# Patient Record
Sex: MALE | Race: WHITE | NOT HISPANIC OR LATINO | Employment: OTHER | URBAN - METROPOLITAN AREA
[De-identification: names, ages, dates, MRNs, and addresses within clinical notes are randomized per-mention and may not be internally consistent; named-entity substitution may affect disease eponyms.]

---

## 2017-12-01 PROBLEM — I50.22 CHRONIC SYSTOLIC CONGESTIVE HEART FAILURE (HCC): Status: ACTIVE | Noted: 2017-12-01

## 2017-12-01 PROBLEM — M54.31 RIGHT SIDED SCIATICA: Status: ACTIVE | Noted: 2017-12-01

## 2017-12-01 PROBLEM — Z79.01 CHRONIC ANTICOAGULATION: Status: ACTIVE | Noted: 2017-12-01

## 2017-12-01 PROBLEM — I48.19 PERSISTENT ATRIAL FIBRILLATION (HCC): Status: ACTIVE | Noted: 2017-12-01

## 2017-12-07 PROBLEM — J30.1 CHRONIC SEASONAL ALLERGIC RHINITIS DUE TO POLLEN: Status: ACTIVE | Noted: 2017-12-07

## 2017-12-07 PROBLEM — N52.01 ERECTILE DYSFUNCTION DUE TO ARTERIAL INSUFFICIENCY: Status: ACTIVE | Noted: 2017-12-07

## 2017-12-07 PROBLEM — Z85.46 HISTORY OF PROSTATE CANCER: Status: ACTIVE | Noted: 2017-12-07

## 2017-12-07 PROBLEM — E78.49 OTHER HYPERLIPIDEMIA: Status: ACTIVE | Noted: 2017-12-07

## 2018-01-04 PROBLEM — N18.2 CRF (CHRONIC RENAL FAILURE), STAGE 2 (MILD): Status: ACTIVE | Noted: 2018-01-04

## 2018-02-21 PROBLEM — G89.29 CHRONIC BILATERAL LOW BACK PAIN WITH RIGHT-SIDED SCIATICA: Status: ACTIVE | Noted: 2018-02-21

## 2018-02-21 PROBLEM — R26.2 DIFFICULTY WALKING: Status: ACTIVE | Noted: 2018-02-21

## 2018-02-21 PROBLEM — M54.41 CHRONIC BILATERAL LOW BACK PAIN WITH RIGHT-SIDED SCIATICA: Status: ACTIVE | Noted: 2018-02-21

## 2018-04-13 PROBLEM — M16.9 HIP OSTEOARTHRITIS: Status: ACTIVE | Noted: 2018-04-13

## 2018-04-13 PROBLEM — M19.012 LOCALIZED OSTEOARTHRITIS OF LEFT SHOULDER: Status: ACTIVE | Noted: 2018-04-13

## 2018-05-02 PROBLEM — M47.816 SPONDYLOSIS OF LUMBAR REGION WITHOUT MYELOPATHY OR RADICULOPATHY: Status: ACTIVE | Noted: 2018-05-02

## 2018-05-02 PROBLEM — M54.31 RIGHT SIDED SCIATICA: Status: RESOLVED | Noted: 2017-12-01 | Resolved: 2018-05-02

## 2018-06-07 PROBLEM — M35.3 PMR (POLYMYALGIA RHEUMATICA) (HCC): Status: ACTIVE | Noted: 2018-06-07

## 2018-11-06 ENCOUNTER — APPOINTMENT (OUTPATIENT)
Dept: RADIOLOGY | Facility: MEDICAL CENTER | Age: 83
End: 2018-11-06
Attending: EMERGENCY MEDICINE
Payer: MEDICARE

## 2018-11-06 ENCOUNTER — HOSPITAL ENCOUNTER (OUTPATIENT)
Facility: MEDICAL CENTER | Age: 83
End: 2018-11-08
Attending: EMERGENCY MEDICINE | Admitting: INTERNAL MEDICINE
Payer: MEDICARE

## 2018-11-06 ENCOUNTER — HOSPITAL ENCOUNTER (OUTPATIENT)
Dept: RADIOLOGY | Facility: MEDICAL CENTER | Age: 83
End: 2018-11-06

## 2018-11-06 DIAGNOSIS — R47.01 APHASIA: ICD-10-CM

## 2018-11-06 DIAGNOSIS — M35.3 PMR (POLYMYALGIA RHEUMATICA) (HCC): ICD-10-CM

## 2018-11-06 DIAGNOSIS — I50.22 CHRONIC SYSTOLIC CONGESTIVE HEART FAILURE (HCC): ICD-10-CM

## 2018-11-06 DIAGNOSIS — G45.9 TIA (TRANSIENT ISCHEMIC ATTACK): ICD-10-CM

## 2018-11-06 DIAGNOSIS — I48.91 ATRIAL FIBRILLATION, UNSPECIFIED TYPE (HCC): ICD-10-CM

## 2018-11-06 PROBLEM — I10 HYPERTENSION: Status: ACTIVE | Noted: 2018-11-06

## 2018-11-06 PROBLEM — R17 ELEVATED BILIRUBIN: Status: ACTIVE | Noted: 2018-11-06

## 2018-11-06 LAB
ABO GROUP BLD: NORMAL
ABO GROUP BLD: NORMAL
ALBUMIN SERPL BCP-MCNC: 4.1 G/DL (ref 3.2–4.9)
ALBUMIN/GLOB SERPL: 1.3 G/DL
ALP SERPL-CCNC: 66 U/L (ref 30–99)
ALT SERPL-CCNC: 25 U/L (ref 2–50)
ANION GAP SERPL CALC-SCNC: 10 MMOL/L (ref 0–11.9)
APPEARANCE UR: CLEAR
APTT PPP: 28.1 SEC (ref 24.7–36)
AST SERPL-CCNC: 26 U/L (ref 12–45)
BASOPHILS # BLD AUTO: 0.6 % (ref 0–1.8)
BASOPHILS # BLD: 0.07 K/UL (ref 0–0.12)
BILIRUB CONJ SERPL-MCNC: 0.3 MG/DL (ref 0.1–0.5)
BILIRUB SERPL-MCNC: 2.1 MG/DL (ref 0.1–1.5)
BILIRUB UR QL STRIP.AUTO: NEGATIVE
BLD GP AB SCN SERPL QL: NORMAL
BUN SERPL-MCNC: 22 MG/DL (ref 8–22)
CALCIUM SERPL-MCNC: 9.6 MG/DL (ref 8.5–10.5)
CHLORIDE SERPL-SCNC: 108 MMOL/L (ref 96–112)
CO2 SERPL-SCNC: 25 MMOL/L (ref 20–33)
COLOR UR: YELLOW
CREAT SERPL-MCNC: 1.21 MG/DL (ref 0.5–1.4)
EOSINOPHIL # BLD AUTO: 0.29 K/UL (ref 0–0.51)
EOSINOPHIL NFR BLD: 2.4 % (ref 0–6.9)
ERYTHROCYTE [DISTWIDTH] IN BLOOD BY AUTOMATED COUNT: 52.5 FL (ref 35.9–50)
GLOBULIN SER CALC-MCNC: 3.2 G/DL (ref 1.9–3.5)
GLUCOSE SERPL-MCNC: 82 MG/DL (ref 65–99)
GLUCOSE UR STRIP.AUTO-MCNC: NEGATIVE MG/DL
HCT VFR BLD AUTO: 53.2 % (ref 42–52)
HGB BLD-MCNC: 17.8 G/DL (ref 14–18)
IMM GRANULOCYTES # BLD AUTO: 0.08 K/UL (ref 0–0.11)
IMM GRANULOCYTES NFR BLD AUTO: 0.7 % (ref 0–0.9)
INR PPP: 1.78 (ref 0.87–1.13)
KETONES UR STRIP.AUTO-MCNC: NEGATIVE MG/DL
LEUKOCYTE ESTERASE UR QL STRIP.AUTO: NEGATIVE
LYMPHOCYTES # BLD AUTO: 2.19 K/UL (ref 1–4.8)
LYMPHOCYTES NFR BLD: 18.1 % (ref 22–41)
MAGNESIUM SERPL-MCNC: 2.1 MG/DL (ref 1.5–2.5)
MCH RBC QN AUTO: 34.1 PG (ref 27–33)
MCHC RBC AUTO-ENTMCNC: 33.5 G/DL (ref 33.7–35.3)
MCV RBC AUTO: 101.9 FL (ref 81.4–97.8)
MICRO URNS: ABNORMAL
MONOCYTES # BLD AUTO: 1.41 K/UL (ref 0–0.85)
MONOCYTES NFR BLD AUTO: 11.6 % (ref 0–13.4)
NEUTROPHILS # BLD AUTO: 8.07 K/UL (ref 1.82–7.42)
NEUTROPHILS NFR BLD: 66.6 % (ref 44–72)
NITRITE UR QL STRIP.AUTO: NEGATIVE
NRBC # BLD AUTO: 0 K/UL
NRBC BLD-RTO: 0 /100 WBC
PH UR STRIP.AUTO: 5.5 [PH]
PLATELET # BLD AUTO: 164 K/UL (ref 164–446)
PMV BLD AUTO: 10.7 FL (ref 9–12.9)
POTASSIUM SERPL-SCNC: 3.9 MMOL/L (ref 3.6–5.5)
PROT SERPL-MCNC: 7.3 G/DL (ref 6–8.2)
PROT UR QL STRIP: NEGATIVE MG/DL
PROTHROMBIN TIME: 20.8 SEC (ref 12–14.6)
RBC # BLD AUTO: 5.22 M/UL (ref 4.7–6.1)
RBC UR QL AUTO: NEGATIVE
RH BLD: NORMAL
RH BLD: NORMAL
SODIUM SERPL-SCNC: 143 MMOL/L (ref 135–145)
SP GR UR STRIP.AUTO: >=1.045
TROPONIN I SERPL-MCNC: 0.02 NG/ML (ref 0–0.04)
TSH SERPL DL<=0.005 MIU/L-ACNC: 3.37 UIU/ML (ref 0.38–5.33)
UROBILINOGEN UR STRIP.AUTO-MCNC: 0.2 MG/DL
WBC # BLD AUTO: 12.1 K/UL (ref 4.8–10.8)

## 2018-11-06 PROCEDURE — 99285 EMERGENCY DEPT VISIT HI MDM: CPT

## 2018-11-06 PROCEDURE — 81003 URINALYSIS AUTO W/O SCOPE: CPT

## 2018-11-06 PROCEDURE — 82248 BILIRUBIN DIRECT: CPT

## 2018-11-06 PROCEDURE — G0378 HOSPITAL OBSERVATION PER HR: HCPCS

## 2018-11-06 PROCEDURE — 71045 X-RAY EXAM CHEST 1 VIEW: CPT

## 2018-11-06 PROCEDURE — 84484 ASSAY OF TROPONIN QUANT: CPT | Mod: 91

## 2018-11-06 PROCEDURE — 700117 HCHG RX CONTRAST REV CODE 255: Performed by: EMERGENCY MEDICINE

## 2018-11-06 PROCEDURE — 86901 BLOOD TYPING SEROLOGIC RH(D): CPT

## 2018-11-06 PROCEDURE — 85025 COMPLETE CBC W/AUTO DIFF WBC: CPT | Mod: 91

## 2018-11-06 PROCEDURE — 70498 CT ANGIOGRAPHY NECK: CPT

## 2018-11-06 PROCEDURE — 83735 ASSAY OF MAGNESIUM: CPT

## 2018-11-06 PROCEDURE — 36415 COLL VENOUS BLD VENIPUNCTURE: CPT

## 2018-11-06 PROCEDURE — 80053 COMPREHEN METABOLIC PANEL: CPT | Mod: 91

## 2018-11-06 PROCEDURE — 94760 N-INVAS EAR/PLS OXIMETRY 1: CPT

## 2018-11-06 PROCEDURE — 0042T CT-CEREBRAL PERFUSION ANALYSIS: CPT

## 2018-11-06 PROCEDURE — 85610 PROTHROMBIN TIME: CPT | Mod: 91

## 2018-11-06 PROCEDURE — 70450 CT HEAD/BRAIN W/O DYE: CPT

## 2018-11-06 PROCEDURE — 93005 ELECTROCARDIOGRAM TRACING: CPT | Performed by: EMERGENCY MEDICINE

## 2018-11-06 PROCEDURE — 700102 HCHG RX REV CODE 250 W/ 637 OVERRIDE(OP): Performed by: INTERNAL MEDICINE

## 2018-11-06 PROCEDURE — 70496 CT ANGIOGRAPHY HEAD: CPT

## 2018-11-06 PROCEDURE — 99220 PR INITIAL OBSERVATION CARE,LEVL III: CPT | Performed by: INTERNAL MEDICINE

## 2018-11-06 PROCEDURE — 86900 BLOOD TYPING SEROLOGIC ABO: CPT

## 2018-11-06 PROCEDURE — 84443 ASSAY THYROID STIM HORMONE: CPT

## 2018-11-06 PROCEDURE — 86850 RBC ANTIBODY SCREEN: CPT

## 2018-11-06 PROCEDURE — A9270 NON-COVERED ITEM OR SERVICE: HCPCS | Performed by: INTERNAL MEDICINE

## 2018-11-06 PROCEDURE — 85730 THROMBOPLASTIN TIME PARTIAL: CPT | Mod: 91

## 2018-11-06 RX ORDER — ONDANSETRON 4 MG/1
4 TABLET, ORALLY DISINTEGRATING ORAL EVERY 4 HOURS PRN
Status: DISCONTINUED | OUTPATIENT
Start: 2018-11-06 | End: 2018-11-08 | Stop reason: HOSPADM

## 2018-11-06 RX ORDER — ONDANSETRON 2 MG/ML
4 INJECTION INTRAMUSCULAR; INTRAVENOUS EVERY 4 HOURS PRN
Status: DISCONTINUED | OUTPATIENT
Start: 2018-11-06 | End: 2018-11-08 | Stop reason: HOSPADM

## 2018-11-06 RX ORDER — WARFARIN SODIUM 2.5 MG/1
2.5 TABLET ORAL
Status: DISCONTINUED | OUTPATIENT
Start: 2018-11-08 | End: 2018-11-07

## 2018-11-06 RX ORDER — ASPIRIN 325 MG
325 TABLET ORAL DAILY
Status: DISCONTINUED | OUTPATIENT
Start: 2018-11-06 | End: 2018-11-06

## 2018-11-06 RX ORDER — LABETALOL HYDROCHLORIDE 5 MG/ML
10 INJECTION, SOLUTION INTRAVENOUS EVERY 4 HOURS PRN
Status: DISCONTINUED | OUTPATIENT
Start: 2018-11-06 | End: 2018-11-08 | Stop reason: HOSPADM

## 2018-11-06 RX ORDER — GABAPENTIN 300 MG/1
300 CAPSULE ORAL 3 TIMES DAILY
Status: DISCONTINUED | OUTPATIENT
Start: 2018-11-06 | End: 2018-11-08 | Stop reason: HOSPADM

## 2018-11-06 RX ORDER — HYDRALAZINE HYDROCHLORIDE 20 MG/ML
10 INJECTION INTRAMUSCULAR; INTRAVENOUS
Status: DISCONTINUED | OUTPATIENT
Start: 2018-11-06 | End: 2018-11-08 | Stop reason: HOSPADM

## 2018-11-06 RX ORDER — AMOXICILLIN 250 MG
2 CAPSULE ORAL 2 TIMES DAILY
Status: DISCONTINUED | OUTPATIENT
Start: 2018-11-07 | End: 2018-11-08 | Stop reason: HOSPADM

## 2018-11-06 RX ORDER — WARFARIN SODIUM 4 MG/1
4 TABLET ORAL
Status: COMPLETED | OUTPATIENT
Start: 2018-11-06 | End: 2018-11-06

## 2018-11-06 RX ORDER — ASPIRIN 81 MG/1
81 TABLET, CHEWABLE ORAL DAILY
Status: DISCONTINUED | OUTPATIENT
Start: 2018-11-06 | End: 2018-11-08 | Stop reason: HOSPADM

## 2018-11-06 RX ORDER — WARFARIN SODIUM 2.5 MG/1
1.25 TABLET ORAL
Status: DISCONTINUED | OUTPATIENT
Start: 2018-11-07 | End: 2018-11-07

## 2018-11-06 RX ORDER — ACETAMINOPHEN 325 MG/1
650 TABLET ORAL EVERY 6 HOURS PRN
Status: DISCONTINUED | OUTPATIENT
Start: 2018-11-06 | End: 2018-11-08 | Stop reason: HOSPADM

## 2018-11-06 RX ORDER — BISACODYL 10 MG
10 SUPPOSITORY, RECTAL RECTAL
Status: DISCONTINUED | OUTPATIENT
Start: 2018-11-06 | End: 2018-11-08 | Stop reason: HOSPADM

## 2018-11-06 RX ORDER — ASPIRIN 81 MG/1
324 TABLET, CHEWABLE ORAL DAILY
Status: DISCONTINUED | OUTPATIENT
Start: 2018-11-06 | End: 2018-11-06

## 2018-11-06 RX ORDER — POLYETHYLENE GLYCOL 3350 17 G/17G
1 POWDER, FOR SOLUTION ORAL
Status: DISCONTINUED | OUTPATIENT
Start: 2018-11-06 | End: 2018-11-08 | Stop reason: HOSPADM

## 2018-11-06 RX ORDER — CARVEDILOL 6.25 MG/1
3.12 TABLET ORAL 2 TIMES DAILY WITH MEALS
Status: DISCONTINUED | OUTPATIENT
Start: 2018-11-06 | End: 2018-11-08 | Stop reason: HOSPADM

## 2018-11-06 RX ORDER — WARFARIN SODIUM 2.5 MG/1
2.5 TABLET ORAL DAILY
COMMUNITY
End: 2020-04-14 | Stop reason: SDUPTHER

## 2018-11-06 RX ORDER — ASPIRIN 300 MG/1
300 SUPPOSITORY RECTAL DAILY
Status: DISCONTINUED | OUTPATIENT
Start: 2018-11-06 | End: 2018-11-06

## 2018-11-06 RX ORDER — ATORVASTATIN CALCIUM 40 MG/1
40 TABLET, FILM COATED ORAL EVERY EVENING
Status: DISCONTINUED | OUTPATIENT
Start: 2018-11-06 | End: 2018-11-08 | Stop reason: HOSPADM

## 2018-11-06 RX ORDER — GABAPENTIN 300 MG/1
300 CAPSULE ORAL 3 TIMES DAILY
COMMUNITY
End: 2020-02-14

## 2018-11-06 RX ORDER — CHLORAL HYDRATE 500 MG
1000 CAPSULE ORAL EVERY EVENING
COMMUNITY

## 2018-11-06 RX ORDER — PREDNISONE 5 MG/1
6 TABLET ORAL DAILY
COMMUNITY
End: 2018-11-09 | Stop reason: SDUPTHER

## 2018-11-06 RX ADMIN — ASPIRIN 81 MG: 81 TABLET, CHEWABLE ORAL at 19:54

## 2018-11-06 RX ADMIN — CARVEDILOL 3.12 MG: 6.25 TABLET, FILM COATED ORAL at 19:55

## 2018-11-06 RX ADMIN — WARFARIN SODIUM 4 MG: 4 TABLET ORAL at 20:32

## 2018-11-06 RX ADMIN — IOHEXOL 140 ML: 350 INJECTION, SOLUTION INTRAVENOUS at 13:14

## 2018-11-06 RX ADMIN — GABAPENTIN 300 MG: 300 CAPSULE ORAL at 19:53

## 2018-11-06 RX ADMIN — ATORVASTATIN CALCIUM 40 MG: 40 TABLET, FILM COATED ORAL at 19:54

## 2018-11-06 ASSESSMENT — COGNITIVE AND FUNCTIONAL STATUS - GENERAL
DRESSING REGULAR UPPER BODY CLOTHING: A LITTLE
WALKING IN HOSPITAL ROOM: A LITTLE
SUGGESTED CMS G CODE MODIFIER MOBILITY: CK
TOILETING: A LITTLE
SUGGESTED CMS G CODE MODIFIER DAILY ACTIVITY: CK
EATING MEALS: A LITTLE
TURNING FROM BACK TO SIDE WHILE IN FLAT BAD: A LITTLE
MOVING TO AND FROM BED TO CHAIR: A LITTLE
MOBILITY SCORE: 18
STANDING UP FROM CHAIR USING ARMS: A LITTLE
HELP NEEDED FOR BATHING: A LITTLE
DAILY ACTIVITIY SCORE: 18
MOVING FROM LYING ON BACK TO SITTING ON SIDE OF FLAT BED: A LITTLE
PERSONAL GROOMING: A LITTLE
CLIMB 3 TO 5 STEPS WITH RAILING: A LITTLE
DRESSING REGULAR LOWER BODY CLOTHING: A LITTLE

## 2018-11-06 ASSESSMENT — CHA2DS2 SCORE
CHA2DS2 VASC SCORE: 5
VASCULAR DISEASE: NO
DIABETES: NO
CHF OR LEFT VENTRICULAR DYSFUNCTION: NO
SEX: MALE
PRIOR STROKE OR TIA OR THROMBOEMBOLISM: YES
AGE 75 OR GREATER: YES
AGE 65 TO 74: NO
HYPERTENSION: YES

## 2018-11-06 ASSESSMENT — ENCOUNTER SYMPTOMS
SEIZURES: 0
FEVER: 0
DIZZINESS: 0
DOUBLE VISION: 0
BACK PAIN: 0
NERVOUS/ANXIOUS: 0
VOMITING: 0
CHILLS: 0
PHOTOPHOBIA: 0
SPEECH CHANGE: 1
NECK PAIN: 0
BLURRED VISION: 0
HALLUCINATIONS: 0
HEADACHES: 0
HEMOPTYSIS: 0
SENSORY CHANGE: 0
HEARTBURN: 0
ORTHOPNEA: 0
SPUTUM PRODUCTION: 0
DEPRESSION: 0
WEIGHT LOSS: 0
TINGLING: 0
MYALGIAS: 0
FOCAL WEAKNESS: 0
BRUISES/BLEEDS EASILY: 0
COUGH: 0
PALPITATIONS: 0
NAUSEA: 0

## 2018-11-06 ASSESSMENT — PATIENT HEALTH QUESTIONNAIRE - PHQ9
SUM OF ALL RESPONSES TO PHQ9 QUESTIONS 1 AND 2: 0
2. FEELING DOWN, DEPRESSED, IRRITABLE, OR HOPELESS: NOT AT ALL
1. LITTLE INTEREST OR PLEASURE IN DOING THINGS: NOT AT ALL

## 2018-11-06 ASSESSMENT — LIFESTYLE VARIABLES: SUBSTANCE_ABUSE: 0

## 2018-11-06 ASSESSMENT — PAIN SCALES - GENERAL: PAINLEVEL_OUTOF10: 0

## 2018-11-06 NOTE — ED TRIAGE NOTES
Chief Complaint   Patient presents with   • Possible Stroke     Pt BIB Med flight transfer from [facility]/ pt @ 0930 with expressive apahgia, now resolved/ pt on Coumadin. pt A&Ox4     Pt to CT.

## 2018-11-06 NOTE — DISCHARGE PLANNING
PMR order received from Dr. Man.  MCR/ANT is shown for his medical provider.  Presented from [facility] with stroke-like sx.  W/U & TX evals are pending.  TCC remains monitoring.  This referral will not be forwarded to Physiatry @ this time.  I do appreciate the referral.

## 2018-11-06 NOTE — ED NOTES
Med rec complete per medication bottles at bedside (returned)  Allergies reviewed  No PO antibiotics in last 30 days    Patient takes Coumadin 2.5 mg Monday and Thursday and 1.25 mg all other day, wife stated 11-5-18 (Monday) she accidentally gave a 1/2 tablet instead of a whole tablet

## 2018-11-06 NOTE — H&P
Hospital Medicine History and Physical    Date of Service  11/6/2018    Chief Complaint  Chief Complaint   Patient presents with   • Possible Stroke     Pt BIB Med flight transfer from [facility] center/ pt @ 0930 with expressive apahgia, now resolved/ pt on Coumadin. pt A&Ox4       History of Presenting Illness  85 y.o. male with past medical history of hypertension, atrial fibrillation, on chronic anticoagulation with warfarin, pacemaker, prostate cancer, who was transferred from [facility] with suspicion for possible stroke or TIA.  At approximately 9:30 AM today he began experiencing aphasia, when he was having difficulty expressing himself.  Apparently symptoms resolved before he was transferred.  CAT scan of the head without contrast at outside facility, CT a of the neck and head negative for obstruction.  INR was found to be at 1.77.    Primary Care Physician  Adolph Johnston M.D.    Consultants  Neurology    Code Status  Code: Full code    Review of Systems  Review of Systems   Constitutional: Negative for chills, fever and weight loss.   HENT: Negative for ear pain, hearing loss and tinnitus.    Eyes: Negative for blurred vision, double vision and photophobia.   Respiratory: Negative for cough, hemoptysis and sputum production.    Cardiovascular: Negative for chest pain, palpitations and orthopnea.   Gastrointestinal: Negative for heartburn, nausea and vomiting.   Genitourinary: Negative for dysuria, frequency and urgency.   Musculoskeletal: Negative for back pain, myalgias and neck pain.   Skin: Negative for itching and rash.   Neurological: Positive for speech change. Negative for dizziness, tingling, sensory change, focal weakness, seizures and headaches.   Endo/Heme/Allergies: Does not bruise/bleed easily.   Psychiatric/Behavioral: Negative for depression, hallucinations, substance abuse and suicidal ideas. The patient is not nervous/anxious.           Past Medical History  Past Medical History:    Diagnosis Date   • Hypertension    • Pacemaker    • Prostate CA (HCC)      Surgical History  No past surgical history on file.    Medications  No current facility-administered medications on file prior to encounter.      Current Outpatient Prescriptions on File Prior to Encounter   Medication Sig Dispense Refill   • Coenzyme Q10 (CO Q-10) 30 MG Cap Take 1 Cap by mouth every day.     • carvedilol (COREG) 3.125 MG Tab Take 1 Tab by mouth 2 times a day, with meals. 180 Tab 3   • lisinopril (PRINIVIL) 20 MG Tab Take 1 Tab by mouth every day. 90 Tab 3       Family History  Denies family history of stroke    Social History  Social History   Substance Use Topics   • Smoking status: Former Smoker   • Smokeless tobacco: Never Used   • Alcohol use Yes       Allergies  No Known Allergies     Physical Exam  Laboratory   Hemodynamics  Temp (24hrs), Av.7 °C (98.1 °F), Min:36.7 °C (98.1 °F), Max:36.7 °C (98.1 °F)      Pulse  Av.3  Min: 72  Max: 74           Respiratory                    Physical Exam   Constitutional: He is oriented to person, place, and time. He appears well-developed and well-nourished.   HENT:   Head: Normocephalic and atraumatic.   Eyes: Pupils are equal, round, and reactive to light. EOM are normal.   Neck: Normal range of motion. Neck supple.   Cardiovascular: An irregularly irregular rhythm present.   Pulmonary/Chest: Effort normal and breath sounds normal. No respiratory distress.   Abdominal: Soft. Bowel sounds are normal. There is no tenderness.   Musculoskeletal: Normal range of motion.   Neurological: He is alert and oriented to person, place, and time. He has normal strength.   Skin: Skin is warm and dry.   Psychiatric: He has a normal mood and affect.   Nursing note and vitals reviewed.        Assessment/Plan  TIA (transient ischemic attack)   Assessment & Plan    Transient aphasia in the setting of subtherapeutic INR  TIA / Stroke workup  Pt will be admitted for stroke workup. The pt had  a negative brain CT, CTA of the head and neck. The pt will be monitored on telemetry with neuro checks.  Unable to perform the MRI due to pacemaker.  echocardiogram  PENDING. The pt will be seen by PT/OT.   Pt will be placed on aspirin and statin. A lipid panel and Hba1c will also be checked.   Warfarin dose will be increased for INR for up to goal  2.0-3.0 per pharmacy       Elevated bilirubin   Assessment & Plan    Mild.  We will check direct bilirubin.  Repeat bilirubin tomorrow, consider ultrasound of right upper quadrant     Hypertension   Assessment & Plan    Permissive hypertension first 24 hours starting from 9 AM on 11/6     PMR (polymyalgia rheumatica) (HCC)- (present on admission)   Assessment & Plan    Controlled.  Continue low dose of prednisone.  Follow with rheumatology     AF (atrial fibrillation) (HCC)- (present on admission)   Assessment & Plan    Continue carvedilol, warfarin for secondary stroke prevention  Monitor on telemetry     Chronic anticoagulation- (present on admission)   Assessment & Plan    INR is subtherapeutic.  Continue warfarin per pharmacy.  Adjust dose as needed for goal from 2.0 to 3.0         I anticipate this patient is appropriate for observation status at this time.    Prophylaxis: warfarin    Recent Labs      11/06/18   1024  11/06/18   1238   WBC  10.4  12.1*   RBC  5.03  5.22   HEMOGLOBIN  17.1  17.8   HEMATOCRIT  51.9  53.2*   MCV  103.2*  101.9*   MCH  34.0*  34.1*   MCHC  32.9*  33.5*   RDW  14.1  52.5*   PLATELETCT  169  164   MPV  10.9*  10.7     Recent Labs      11/06/18   1024  11/06/18   1238   SODIUM  146*  143   POTASSIUM  3.7  3.9   CHLORIDE  110*  108   CO2  27  25   GLUCOSE  69*  82   BUN  23*  22   CREATININE  1.4*  1.21   CALCIUM  8.9  9.6     Recent Labs      11/06/18   1024  11/06/18   1238   ALTSGPT  36  25   ASTSGOT  30  26   ALKPHOSPHAT  76  66   TBILIRUBIN  1.7*  2.1*   GLUCOSE  69*  82     Recent Labs      11/06/18   1024  11/06/18   1238   APTT   25.4  28.1   INR  1.77  1.78*             Lab Results   Component Value Date    TROPONINI 0.02 11/06/2018       Imaging  DX-CHEST-PORTABLE (1 VIEW)   Final Result      8 mm left basilar nodule is not excluded. Small nodules are seen in the right upper lobe. Correlation with prior imaging would be of value. If no prior imaging is available for comparison, further evaluation with CT chest is recommended.      Cardiomegaly.      Atherosclerotic plaque.      Mild bibasilar opacities likely represent atelectasis.         CT-CTA NECK WITH & W/O-POST PROCESSING   Final Result      1.  Unremarkable CT angiogram of the neck. No large vessel occlusion, high-grade stenosis, dissection or aneurysm.   2.  Biapical emphysema.      CT-CTA HEAD WITH & W/O-POST PROCESS   Final Result         1. 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   2. No evidence of large vessel occlusion or high-grade stenosis of the [place] of [place].      OUTSIDE IMAGES-CT HEAD   Final Result      CT-CEREBRAL PERFUSION ANALYSIS   Final Result      1.  CT perfusion examination over the limited section of brain reveals 0 mL of brain parenchyma has less than 30% of cerebral blood flow (CBF).      2.  Please note that the cerebral perfusion was performed on the limited brain tissue around the basal ganglia region. Infarct/ischemia outside the CT perfusion sections can be missed in this study.      CT-HEAD W/O   Final Result      No acute intracranial abnormality is identified.      Atrophy      There are periventricular and subcortical white matter changes present.  This finding is nonspecific and could be from previous small vessel ischemia, demyelination, or gliosis.

## 2018-11-06 NOTE — ED PROVIDER NOTES
ED Provider Note    Scribed for Jose M Fernandez M.D. by Khari Daniel. 11/6/2018  12:41 PM    Primary care provider: Adolph Johnston M.D.  Means of arrival: Med Flight  History obtained from: Patient and EMS  History limited by: None     CHIEF COMPLAINT  Chief Complaint   Patient presents with   • Possible Stroke     Pt BIB Med flight transfer from [facility] center/ pt @ 0930 with expressive apahgia, now resolved/ pt on Coumadin. pt A&Ox4       HPI  Jim Stapleton is a 85 y.o. male who presents to the Emergency Department for possible stroke. The patient presents as a transfer patient from [facility]. He has a history of a-fib on coumadin. He began experiencing acute onset aphasia at 0930 this morning and had a CT scan which was negative and INR of 1.77 at [place]. Med flight reports the patients symptoms have been improving at the transferring facility and en route here. They report much if not all of his aphasia has resolved and he is able to say what he is trying to. They report mild baseline weakness, that he is oriented, and has no facial droop. Patient reports this morning his was unable to think about what he wanted to say but this has improved. He denies any headaches, numbness, tingling, weakness, vision changes at this time.     REVIEW OF SYSTEMS  Pertinent positives include resolving aphasia. Pertinent negatives include no headaches, numbness, tingling, weakness, vision changes.  All other systems reviewed and negative.    PAST MEDICAL HISTORY   has a past medical history of Hypertension; Pacemaker; and Prostate CA (HCC).    SURGICAL HISTORY  patient denies any surgical history    SOCIAL HISTORY  Social History   Substance Use Topics   • Smoking status: Former Smoker   • Smokeless tobacco: Never Used   • Alcohol use Yes      History   Drug Use No       FAMILY HISTORY  None pertinent     CURRENT MEDICATIONS  No current facility-administered medications on file prior to encounter.       Current Outpatient Prescriptions on File Prior to Encounter   Medication Sig Dispense Refill   • Coenzyme Q10 (CO Q-10) 30 MG Cap Take 1 Cap by mouth every day.     • carvedilol (COREG) 3.125 MG Tab Take 1 Tab by mouth 2 times a day, with meals. 180 Tab 3   • lisinopril (PRINIVIL) 20 MG Tab Take 1 Tab by mouth every day. 90 Tab 3     ALLERGIES  No Known Allergies    PHYSICAL EXAM  VITAL SIGNS: Pulse 72   Resp 15   Wt 74.4 kg (164 lb)   SpO2 97%   BMI 24.94 kg/m²     Constitutional: Well developed, Well nourished, Mild distress, Non-toxic appearance.   HENT: Normocephalic, Atraumatic, Bilateral external ears normal, Oropharynx moist, No oral exudates.   Eyes: PERRLA, EOMI, Conjunctiva normal, No discharge.   Neck: No tenderness, Supple, No stridor.   Lymphatic: No lymphadenopathy noted.   Cardiovascular: Normal heart rate, Normal rhythm.   Thorax & Lungs: Clear to auscultation bilaterally, No respiratory distress, No wheezing, No crackles.   Abdomen: Soft, No tenderness, No masses, No pulsatile masses.   Skin: Warm, Dry, No erythema, No rash.   Extremities:, No edema No cyanosis.   Musculoskeletal: No tenderness to palpation or major deformities noted.  Intact distal pulses  Neurologic: Awake, alert. Moves all extremities spontaneously. Normal visual fields, Normal sensation. Finger to nose is normal. Awake, alert. Cranial nerves 2-11 intact, muscle strength 5/5 in bilateral upper and lower extremities. NIH score of 0.   Psychiatric: Affect normal, Judgment normal, Mood normal.     LABS  Labs Reviewed   CBC WITH DIFFERENTIAL - Abnormal; Notable for the following:        Result Value    WBC 12.1 (*)     Hematocrit 53.2 (*)     .9 (*)     MCH 34.1 (*)     MCHC 33.5 (*)     RDW 52.5 (*)     Lymphocytes 18.10 (*)     Neutrophils (Absolute) 8.07 (*)     Monos (Absolute) 1.41 (*)     All other components within normal limits    Narrative:     Indicate which anticoagulants the patient is on:->UNKNOWN   COMP  METABOLIC PANEL - Abnormal; Notable for the following:     Total Bilirubin 2.1 (*)     All other components within normal limits    Narrative:     Indicate which anticoagulants the patient is on:->UNKNOWN   PROTHROMBIN TIME - Abnormal; Notable for the following:     PT 20.8 (*)     INR 1.78 (*)     All other components within normal limits    Narrative:     Indicate which anticoagulants the patient is on:->UNKNOWN   URINALYSIS,CULTURE IF INDICATED - Abnormal; Notable for the following:     Specific Gravity >=1.045 (*)     All other components within normal limits   ESTIMATED GFR - Abnormal; Notable for the following:     GFR If Non  57 (*)     All other components within normal limits    Narrative:     Indicate which anticoagulants the patient is on:->UNKNOWN   APTT    Narrative:     Indicate which anticoagulants the patient is on:->UNKNOWN   COD (ADULT)   TROPONIN    Narrative:     Indicate which anticoagulants the patient is on:->UNKNOWN   REFRACTOMETER SG   ABO AND RH CONFIRMATION   HEMOGLOBIN A1C   TSH   MAGNESIUM     All labs reviewed by me.    RADIOLOGY  DX-CHEST-PORTABLE (1 VIEW)   Final Result      8 mm left basilar nodule is not excluded. Small nodules are seen in the right upper lobe. Correlation with prior imaging would be of value. If no prior imaging is available for comparison, further evaluation with CT chest is recommended.      Cardiomegaly.      Atherosclerotic plaque.      Mild bibasilar opacities likely represent atelectasis.         CT-CTA NECK WITH & W/O-POST PROCESSING   Final Result      1.  Unremarkable CT angiogram of the neck. No large vessel occlusion, high-grade stenosis, dissection or aneurysm.   2.  Biapical emphysema.      CT-CTA HEAD WITH & W/O-POST PROCESS   Final Result         1. 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   2. No evidence of large vessel occlusion or high-grade stenosis of the [place] of [place].      OUTSIDE IMAGES-CT HEAD   Final Result       CT-CEREBRAL PERFUSION ANALYSIS   Final Result      1.  CT perfusion examination over the limited section of brain reveals 0 mL of brain parenchyma has less than 30% of cerebral blood flow (CBF).      2.  Please note that the cerebral perfusion was performed on the limited brain tissue around the basal ganglia region. Infarct/ischemia outside the CT perfusion sections can be missed in this study.      CT-HEAD W/O   Final Result      No acute intracranial abnormality is identified.      Atrophy      There are periventricular and subcortical white matter changes present.  This finding is nonspecific and could be from previous small vessel ischemia, demyelination, or gliosis.      EC-ECHOCARDIOGRAM COMPLETE W/O CONT    (Results Pending)     The radiologist's interpretation of all radiological studies have been reviewed by me.      COURSE & MEDICAL DECISION MAKING  Pertinent Labs & Imaging studies reviewed. (See chart for details)    12:36 PM - Patient seen and examined at bedside. Ordered chest x-ray, CT-head, CT-cerebral perfusion analysis, CTA head and neck, CBC, CMP, Prothrombin, APTT, COD, Troponin, UA and EkG to evaluate his symptoms. The differential diagnoses include but are not limited to: CVA, TIA. The patient was made aware of his plan of care and was agreeable at this time.     1:56 PM I discussed the patient's case and the above findings with Dr. Daily (Hospitalist) who agreed to admit the patient. Patient was made aware of his further plan of care and was agreeable.     Decision Making:  Patient with aphasia, resolved upon arrival, NIH of 0, got stroke workup that was negative.  The patient has not an alteplase candidate due to the patient's elevated INR, discussed the case with the hospitalist for admission the hospital.    DISPOSITION:  Patient will be admitted to Dr. Daily in guarded condition.       FINAL IMPRESSION  1. Aphasia          I, Khari Daniel (Scribe), am scribing for, and in the  presence of, Jose M Fernandez M.D..    Electronically signed by: Khari Daniel (Scribe), 11/6/2018    I, Jose M Fernandez M.D. personally performed the services described in this documentation, as scribed by Khari Daniel in my presence, and it is both accurate and complete. C.   The note accurately reflects work and decisions made by me.  Jose M Fernandez  11/6/2018  6:03 PM

## 2018-11-07 ENCOUNTER — APPOINTMENT (OUTPATIENT)
Dept: CARDIOLOGY | Facility: MEDICAL CENTER | Age: 83
End: 2018-11-07
Attending: INTERNAL MEDICINE
Payer: MEDICARE

## 2018-11-07 ENCOUNTER — PATIENT OUTREACH (OUTPATIENT)
Dept: HEALTH INFORMATION MANAGEMENT | Facility: OTHER | Age: 83
End: 2018-11-07

## 2018-11-07 PROBLEM — R17 ELEVATED BILIRUBIN: Status: RESOLVED | Noted: 2018-11-06 | Resolved: 2018-11-07

## 2018-11-07 LAB
ANION GAP SERPL CALC-SCNC: 7 MMOL/L (ref 0–11.9)
BASOPHILS # BLD AUTO: 0.9 % (ref 0–1.8)
BASOPHILS # BLD: 0.07 K/UL (ref 0–0.12)
BUN SERPL-MCNC: 22 MG/DL (ref 8–22)
CALCIUM SERPL-MCNC: 9 MG/DL (ref 8.5–10.5)
CHLORIDE SERPL-SCNC: 109 MMOL/L (ref 96–112)
CHOLEST SERPL-MCNC: 164 MG/DL (ref 100–199)
CO2 SERPL-SCNC: 25 MMOL/L (ref 20–33)
CREAT SERPL-MCNC: 1.11 MG/DL (ref 0.5–1.4)
EKG IMPRESSION: NORMAL
EOSINOPHIL # BLD AUTO: 0.38 K/UL (ref 0–0.51)
EOSINOPHIL NFR BLD: 4.7 % (ref 0–6.9)
ERYTHROCYTE [DISTWIDTH] IN BLOOD BY AUTOMATED COUNT: 51 FL (ref 35.9–50)
EST. AVERAGE GLUCOSE BLD GHB EST-MCNC: 126 MG/DL
GLUCOSE SERPL-MCNC: 90 MG/DL (ref 65–99)
HBA1C MFR BLD: 6 % (ref 0–5.6)
HCT VFR BLD AUTO: 47.3 % (ref 42–52)
HDLC SERPL-MCNC: 41 MG/DL
HGB BLD-MCNC: 15.9 G/DL (ref 14–18)
IMM GRANULOCYTES # BLD AUTO: 0.04 K/UL (ref 0–0.11)
IMM GRANULOCYTES NFR BLD AUTO: 0.5 % (ref 0–0.9)
INR PPP: 1.85 (ref 0.87–1.13)
LDLC SERPL CALC-MCNC: 107 MG/DL
LV EJECT FRACT  99904: 60
LV EJECT FRACT MOD 2C 99903: 60.11
LV EJECT FRACT MOD 4C 99902: 63.18
LV EJECT FRACT MOD BP 99901: 65.26
LYMPHOCYTES # BLD AUTO: 1.97 K/UL (ref 1–4.8)
LYMPHOCYTES NFR BLD: 24.5 % (ref 22–41)
MCH RBC QN AUTO: 33.9 PG (ref 27–33)
MCHC RBC AUTO-ENTMCNC: 33.6 G/DL (ref 33.7–35.3)
MCV RBC AUTO: 100.9 FL (ref 81.4–97.8)
MONOCYTES # BLD AUTO: 1.07 K/UL (ref 0–0.85)
MONOCYTES NFR BLD AUTO: 13.3 % (ref 0–13.4)
NEUTROPHILS # BLD AUTO: 4.52 K/UL (ref 1.82–7.42)
NEUTROPHILS NFR BLD: 56.1 % (ref 44–72)
NRBC # BLD AUTO: 0 K/UL
NRBC BLD-RTO: 0 /100 WBC
PLATELET # BLD AUTO: 146 K/UL (ref 164–446)
PMV BLD AUTO: 11.1 FL (ref 9–12.9)
POTASSIUM SERPL-SCNC: 3.8 MMOL/L (ref 3.6–5.5)
PROTHROMBIN TIME: 21.4 SEC (ref 12–14.6)
RBC # BLD AUTO: 4.69 M/UL (ref 4.7–6.1)
SODIUM SERPL-SCNC: 141 MMOL/L (ref 135–145)
TRIGL SERPL-MCNC: 78 MG/DL (ref 0–149)
WBC # BLD AUTO: 8.1 K/UL (ref 4.8–10.8)

## 2018-11-07 PROCEDURE — G8989 SELF CARE D/C STATUS: HCPCS | Mod: CJ

## 2018-11-07 PROCEDURE — 36415 COLL VENOUS BLD VENIPUNCTURE: CPT

## 2018-11-07 PROCEDURE — 97165 OT EVAL LOW COMPLEX 30 MIN: CPT

## 2018-11-07 PROCEDURE — 700111 HCHG RX REV CODE 636 W/ 250 OVERRIDE (IP): Performed by: INTERNAL MEDICINE

## 2018-11-07 PROCEDURE — 85025 COMPLETE CBC W/AUTO DIFF WBC: CPT

## 2018-11-07 PROCEDURE — G0378 HOSPITAL OBSERVATION PER HR: HCPCS

## 2018-11-07 PROCEDURE — 700102 HCHG RX REV CODE 250 W/ 637 OVERRIDE(OP): Performed by: HOSPITALIST

## 2018-11-07 PROCEDURE — G8979 MOBILITY GOAL STATUS: HCPCS | Mod: CI

## 2018-11-07 PROCEDURE — 99224 PR SUBSEQUENT OBSERVATION CARE,LEVEL I: CPT | Performed by: HOSPITALIST

## 2018-11-07 PROCEDURE — 97535 SELF CARE MNGMENT TRAINING: CPT

## 2018-11-07 PROCEDURE — A9270 NON-COVERED ITEM OR SERVICE: HCPCS | Performed by: INTERNAL MEDICINE

## 2018-11-07 PROCEDURE — 85610 PROTHROMBIN TIME: CPT

## 2018-11-07 PROCEDURE — 80061 LIPID PANEL: CPT

## 2018-11-07 PROCEDURE — A9270 NON-COVERED ITEM OR SERVICE: HCPCS | Performed by: HOSPITALIST

## 2018-11-07 PROCEDURE — 80048 BASIC METABOLIC PNL TOTAL CA: CPT

## 2018-11-07 PROCEDURE — 700102 HCHG RX REV CODE 250 W/ 637 OVERRIDE(OP): Performed by: INTERNAL MEDICINE

## 2018-11-07 PROCEDURE — 93306 TTE W/DOPPLER COMPLETE: CPT

## 2018-11-07 PROCEDURE — 97162 PT EVAL MOD COMPLEX 30 MIN: CPT

## 2018-11-07 PROCEDURE — G8988 SELF CARE GOAL STATUS: HCPCS | Mod: CJ

## 2018-11-07 PROCEDURE — G8978 MOBILITY CURRENT STATUS: HCPCS | Mod: CK

## 2018-11-07 PROCEDURE — G8987 SELF CARE CURRENT STATUS: HCPCS | Mod: CJ

## 2018-11-07 PROCEDURE — 93306 TTE W/DOPPLER COMPLETE: CPT | Mod: 26 | Performed by: INTERNAL MEDICINE

## 2018-11-07 PROCEDURE — 83036 HEMOGLOBIN GLYCOSYLATED A1C: CPT

## 2018-11-07 RX ORDER — ATORVASTATIN CALCIUM 40 MG/1
40 TABLET, FILM COATED ORAL EVERY EVENING
Qty: 30 TAB | Refills: 2 | Status: SHIPPED | OUTPATIENT
Start: 2018-11-07 | End: 2019-01-31 | Stop reason: SDUPTHER

## 2018-11-07 RX ORDER — LISINOPRIL 20 MG/1
20 TABLET ORAL
Status: DISCONTINUED | OUTPATIENT
Start: 2018-11-07 | End: 2018-11-08 | Stop reason: HOSPADM

## 2018-11-07 RX ORDER — WARFARIN SODIUM 2.5 MG/1
2.5 TABLET ORAL
Status: COMPLETED | OUTPATIENT
Start: 2018-11-07 | End: 2018-11-07

## 2018-11-07 RX ADMIN — CARVEDILOL 3.12 MG: 6.25 TABLET, FILM COATED ORAL at 08:31

## 2018-11-07 RX ADMIN — PREDNISONE 6 MG: 1 TABLET ORAL at 05:23

## 2018-11-07 RX ADMIN — LISINOPRIL 20 MG: 20 TABLET ORAL at 13:20

## 2018-11-07 RX ADMIN — ASPIRIN 81 MG: 81 TABLET, CHEWABLE ORAL at 05:24

## 2018-11-07 RX ADMIN — GABAPENTIN 300 MG: 300 CAPSULE ORAL at 05:23

## 2018-11-07 RX ADMIN — CARVEDILOL 3.12 MG: 6.25 TABLET, FILM COATED ORAL at 17:03

## 2018-11-07 RX ADMIN — ATORVASTATIN CALCIUM 40 MG: 40 TABLET, FILM COATED ORAL at 17:07

## 2018-11-07 RX ADMIN — GABAPENTIN 300 MG: 300 CAPSULE ORAL at 11:44

## 2018-11-07 RX ADMIN — WARFARIN SODIUM 2.5 MG: 2.5 TABLET ORAL at 17:04

## 2018-11-07 RX ADMIN — GABAPENTIN 300 MG: 300 CAPSULE ORAL at 17:03

## 2018-11-07 RX ADMIN — STANDARDIZED SENNA CONCENTRATE AND DOCUSATE SODIUM 2 TABLET: 8.6; 5 TABLET, FILM COATED ORAL at 17:04

## 2018-11-07 ASSESSMENT — PAIN SCALES - GENERAL
PAINLEVEL_OUTOF10: 0

## 2018-11-07 ASSESSMENT — COGNITIVE AND FUNCTIONAL STATUS - GENERAL
TOILETING: A LITTLE
STANDING UP FROM CHAIR USING ARMS: A LITTLE
DAILY ACTIVITIY SCORE: 21
WALKING IN HOSPITAL ROOM: A LITTLE
CLIMB 3 TO 5 STEPS WITH RAILING: A LOT
TURNING FROM BACK TO SIDE WHILE IN FLAT BAD: A LOT
MOVING FROM LYING ON BACK TO SITTING ON SIDE OF FLAT BED: A LITTLE
MOBILITY SCORE: 15
DRESSING REGULAR LOWER BODY CLOTHING: A LITTLE
SUGGESTED CMS G CODE MODIFIER DAILY ACTIVITY: CJ
HELP NEEDED FOR BATHING: A LITTLE
SUGGESTED CMS G CODE MODIFIER MOBILITY: CK
MOVING TO AND FROM BED TO CHAIR: A LOT

## 2018-11-07 ASSESSMENT — GAIT ASSESSMENTS
GAIT LEVEL OF ASSIST: CONTACT GUARD ASSIST
DEVIATION: ATAXIC
ASSISTIVE DEVICE: SINGLE POINT CANE;FRONT WHEEL WALKER
DISTANCE (FEET): 250

## 2018-11-07 ASSESSMENT — ACTIVITIES OF DAILY LIVING (ADL): TOILETING: INDEPENDENT

## 2018-11-07 NOTE — PROGRESS NOTES
Inpatient Anticoagulation Service Note    Date: 11/6/2018  Reason for Anticoagulation: Atrial Fibrillation   [id number] VASc Score: 5    Hemoglobin Value: 17.8  Hematocrit Value: (!) 53.2  Lab Platelet Value: 164  Target INR: 2.0 to 3.0    INR from last 7 days     Date/Time INR Value    11/06/18 1238 (!)  1.78        Dose from last 7 days     Date/Time Dose (mg)    11/06/18 1700  4        Average Dose (mg): 1.6 (Warfarin 2.5 mg on Mon/Thur and 1.25 mg AOD per patient)  Significant Interactions: Aspirin, Statin, Corticosteroids  Bridge Therapy: No    Reversal Agent Administered: Not Applicable    Comments: Patient on warfarin for AFib admitted with subtherapeutic INR. Patient reportedly takes warfarin 2.5 mg PO on Mon/Thur and 1.25 mg AOD of the week. Patient's wife reports accidentally giving him a 1.25 mg dose last evening instead of his scheduled 2.5 mg dose. Patient has new warfarin drug interaction with ASA which can increase risk of bleeding, all other interactions stable from PTA. Patient tolerating oral diet and there are no reported s/s of bleeding present. Will give increased warfarin dose this evening d/t subtherapeutic INR and trend coags to guide further dose adjustments.    Plan:  Warfarin 4 mg PO today. INR in AM.    Education Material Provided?: No (Chronic warfarin patient)    Pharmacist suggested discharge dosing: Pending return of INR to therapeutic range, resume prior home warfarin regimen of 2.5 mg PO on Mon/Thur and 1.25 mg PO all other days of the week with follow-up INR within 1 week of discharge.     Pharmacy will continue to follow.     Cass Woods, PharmD    Addendum:  Spoke with patient and his wife who confirmed aforementioned warfarin dosing. Patient states his warfarin is managed by his cardiologist Dr. Desir in [place] and his dose has been relatively stable for a while. Patient states he gets his INR checked every 2 weeks unless his INR is out of range when Dr. Desir has it checked  more frequently. Patient confirms no current bleeding with warfarin therapy.     Cass Woods, PharmD

## 2018-11-07 NOTE — DISCHARGE SUMMARY
Discharge Summary    CHIEF COMPLAINT ON ADMISSION  Chief Complaint   Patient presents with   • Possible Stroke     Pt BIB Med flight transfer from [facility] center/ pt @ 0930 with expressive apahgia, now resolved/ pt on Coumadin. pt A&Ox4       Reason for Admission  Stroke     Admission Date  11/6/2018    CODE STATUS  Full Code    HPI & HOSPITAL COURSE  This is a 85 y.o. male here with TIA symptoms that completely resolved. Due to a pacemaker in situ, he was unable to undergo MRI imaging, but his echo did not demonstrate any large clot or other obvious source. He was evaluated by PT/OT and felt to need home health and this was arranged. He was started on a statin. He declined anti-platelet agents due to his Coumadin. His INR was low and in discussion with his wife, he had simply made a medication error several days ago. I advised that he resume his Coumadin at his normal home dose.        Therefore, he is discharged in fair and stable condition to home with organized home healthcare and close outpatient follow-up.      Discharge Date  11/08/18    DISCHARGE DIAGNOSES  Active Problems:    TIA (transient ischemic attack) POA: Yes    Chronic anticoagulation POA: Yes    AF (atrial fibrillation) (HCC) POA: Yes    PMR (polymyalgia rheumatica) (HCC) POA: Yes    Hypertension POA: Yes  Resolved Problems:    Elevated bilirubin POA: Yes      FOLLOW UP  Future Appointments  Date Time Provider Department Center   11/9/2018 10:00 AM Adolph Johnston M.D. [facility]     Adolph Johnston M.D.  [address] [facility] [id number] [phone]    Schedule an appointment as soon as possible for a visit        MEDICATIONS ON DISCHARGE     Medication List      START taking these medications      Instructions   atorvastatin 40 MG Tabs  Commonly known as:  LIPITOR   Take 1 Tab by mouth every evening.  Dose:  40 mg        CONTINUE taking these medications      Instructions   carvedilol 3.125 MG Tabs  Commonly known as:   COREG   Take 1 Tab by mouth 2 times a day, with meals.  Dose:  3.125 mg     Co Q-10 30 MG Caps   Take 1 Cap by mouth every day.  Dose:  1 Cap     fish oil 1000 MG Caps capsule   Take 1,000 mg by mouth every evening.  Dose:  1000 mg     gabapentin 300 MG Caps  Commonly known as:  NEURONTIN   Take 300 mg by mouth 3 times a day.  Dose:  300 mg     lisinopril 20 MG Tabs  Commonly known as:  PRINIVIL   Take 1 Tab by mouth every day.  Dose:  20 mg     multivitamin Tabs   Take 1 Tab by mouth every day.  Dose:  1 Tab     predniSONE 5 MG Tabs  Commonly known as:  DELTASONE   Take 6 mg by mouth every day.  Dose:  6 mg     warfarin 2.5 MG Tabs  Commonly known as:  COUMADIN   Take 1.25-2.5 mg by mouth every day. Monday and Thursday 2.5 mg All other days are 1.25 mg  Dose:  1.25-2.5 mg            Allergies  No Known Allergies    DIET  Orders Placed This Encounter   Procedures   • Diet Order Cardiac (after bedside swallow eval)     Standing Status:   Standing     Number of Occurrences:   1     Order Specific Question:   Diet:     Answer:   Cardiac [6]     Comments:   after bedside swallow eval       ACTIVITY  As tolerated.  Weight bearing as tolerated    CONSULTATIONS  Neurology    PROCEDURES  None    LABORATORY  Lab Results   Component Value Date    SODIUM 141 11/07/2018    POTASSIUM 3.8 11/07/2018    CHLORIDE 109 11/07/2018    CO2 25 11/07/2018    GLUCOSE 90 11/07/2018    BUN 22 11/07/2018    CREATININE 1.11 11/07/2018        Lab Results   Component Value Date    WBC 8.1 11/07/2018    HEMOGLOBIN 15.9 11/07/2018    HEMATOCRIT 47.3 11/07/2018    PLATELETCT 146 (L) 11/07/2018        Total time of the discharge process exceeds 35 minutes.

## 2018-11-07 NOTE — PROGRESS NOTES
Spoke with Dr. Melendez. MD aware of therapy recommendations for discharge, will place appropriate orders.

## 2018-11-07 NOTE — THERAPY
SPEECH PATHOLOGY:  Spoke with RN, and patient is currently on a cardiac diet and tolerating well per report.  RN indicated patient's symptoms have resolved.  RN to clarify with MD as to whether or not swallow evaluation is indicated.

## 2018-11-07 NOTE — ASSESSMENT & PLAN NOTE
Mild.  We will check direct bilirubin.  Repeat bilirubin tomorrow, consider ultrasound of right upper quadrant

## 2018-11-07 NOTE — PROGRESS NOTES
Monitor summary: Afib/flutter/paced  72-81, NC -, QRS 0.12, QT -, with occasional/rare PVCs, rare couplet, bigem and runs of bundle branch block per strip from monitor room.

## 2018-11-07 NOTE — DISCHARGE PLANNING
Received Choice form at 1500  Agency/Facility Name: [facility]  Referral sent per Choice form @ [id number]

## 2018-11-07 NOTE — THERAPY
SPEECH PATHOLOGY:  Spoke with MD and he indicated that swallow evaluation is currently not needed.  Please reconsult should there be a change in status warranting assessment.

## 2018-11-07 NOTE — CARE PLAN
Problem: Safety  Goal: Will remain free from injury  Outcome: PROGRESSING AS EXPECTED  Bed alarm on, bed in lowest and locked position, treaded socks on    Problem: Knowledge Deficit  Goal: Knowledge of disease process/condition, treatment plan, diagnostic tests, and medications will improve  Outcome: PROGRESSING AS EXPECTED  Educated on A FIb and risk factors

## 2018-11-07 NOTE — DISCHARGE INSTRUCTIONS
"Discharge Instructions    Discharged to home by car with relative. Discharged via wheelchair, hospital escort: Yes.  Special equipment needed: Walker    Be sure to schedule a follow-up appointment with your primary care doctor or any specialists as instructed.     Discharge Plan:   Diet Plan: Discussed  Activity Level: Discussed  Confirmed Follow up Appointment: Appointment Scheduled  Confirmed Symptoms Management: Discussed  Medication Reconciliation Updated: Yes    I understand that a diet low in cholesterol, fat, and sodium is recommended for good health. Unless I have been given specific instructions below for another diet, I accept this instruction as my diet prescription.   Other diet: Heart Healthy    Special Instructions:     Stroke/CVA/TIA/Hemorrhagic Ischemia Discharge Instructions  You have had a stroke. Your risk factors have been identified as follows:  Age - Over 55  Gender - Men are at a higher risk than women  High blood pressure  Atrial Fibrillation  Previous TIAs or \"mini strokes\"  High Cholesterol and lipids  Overweight  It is important that you reduce your risk factors to avoid another stroke in the future. Here are some general guidelines to follow:  · Eat healthy - avoid food high in fat.  · Get regular exercise.  · Maintain a healthy weight.  · Avoid smoking.  · Avoid alcohol and illegal drug use.  · Take your medications as directed.  For more information regarding risk factors, refer to pages 17-19 in your Stroke Patient Education Guide. Stroke Education Guide was given to patient and significant other.    Warning signs of a stroke include (which can also be found on page 3 of your Stroke Patient Education Guide):  · Sudden numbness of weakness of the face, arm or leg (especially on one side of the body).  · Sudden confusion, trouble speaking or understanding.  · Sudden trouble seeing in one or both eyes.  · Sudden trouble walking, dizziness, loss of balance or coordination.  · Sudden severe " headache with no known cause.  It is very important to get treatment quickly when a stroke occurs. If you experience any of the above warning signs, call 911 immediately.     Some patients who have had a stroke will be going home on a blood thinner medication called Warfarin (Coumadin).  This medication requires very close monitoring and follow up.  This follow up can be provided by either your Primary Care Physician or by [facility].  The Outpatient Anticoagulation Service is located at the [facility] at [facility] ([facility]).  If you do not know when your follow up appointment is scheduled, call [phone] to verify your appointment time.      · Is patient discharged on Warfarin / Coumadin?   Yes    You are receiving the drug warfarin. Please understand the importance of monitoring warfarin with scheduled PT/INR blood draws.  Follow-up with a call to your personal Doctor's office in 3 days to schedule a PT/INR. .    IMPORTANT: HOW TO USE THIS INFORMATION:  This is a summary and does NOT have all possible information about this product. This information does not assure that this product is safe, effective, or appropriate for you. This information is not individual medical advice and does not substitute for the advice of your health care professional. Always ask your health care professional for complete information about this product and your specific health needs.      WARFARIN - ORAL (WARF-uh-rin)      COMMON BRAND NAME(S): Coumadin      WARNING:  Warfarin can cause very serious (possibly fatal) bleeding. This is more likely to occur when you first start taking this medication or if you take too much warfarin. To decrease your risk for bleeding, your doctor or other health care provider will monitor you closely and check your lab results (INR test) to make sure you are not taking too much warfarin. Keep all medical and  "laboratory appointments. Tell your doctor right away if you notice any signs of serious bleeding. See also Side Effects section.      USES:  This medication is used to treat blood clots (such as in deep vein thrombosis-DVT or pulmonary embolus-PE) and/or to prevent new clots from forming in your body. Preventing harmful blood clots helps to reduce the risk of a stroke or heart attack. Conditions that increase your risk of developing blood clots include a certain type of irregular heart rhythm (atrial fibrillation), heart valve replacement, recent heart attack, and certain surgeries (such as hip/knee replacement). Warfarin is commonly called a \"blood thinner,\" but the more correct term is \"anticoagulant.\" It helps to keep blood flowing smoothly in your body by decreasing the amount of certain substances (clotting proteins) in your blood.      HOW TO USE:  Read the Medication Guide provided by your pharmacist before you start taking warfarin and each time you get a refill. If you have any questions, ask your doctor or pharmacist. Take this medication by mouth with or without food as directed by your doctor or other health care professional, usually once a day. It is very important to take it exactly as directed. Do not increase the dose, take it more frequently, or stop using it unless directed by your doctor. Dosage is based on your medical condition, laboratory tests (such as INR), and response to treatment. Your doctor or other health care provider will monitor you closely while you are taking this medication to determine the right dose for you. Use this medication regularly to get the most benefit from it. To help you remember, take it at the same time each day. It is important to eat a balanced, consistent diet while taking warfarin. Some foods can affect how warfarin works in your body and may affect your treatment and dose. Avoid sudden large increases or decreases in your intake of foods high in vitamin K " (such as broccoli, cauliflower, cabbage, brussels sprouts, kale, spinach, and other green leafy vegetables, liver, green tea, certain vitamin supplements). If you are trying to lose weight, check with your doctor before you try to go on a diet. Cranberry products may also affect how your warfarin works. Limit the amount of cranberry juice (16 ounces/480 milliliters a day) or other cranberry products you may drink or eat.      SIDE EFFECTS:  Nausea, loss of appetite, or stomach/abdominal pain may occur. If any of these effects persist or worsen, tell your doctor or pharmacist promptly. Remember that your doctor has prescribed this medication because he or she has judged that the benefit to you is greater than the risk of side effects. Many people using this medication do not have serious side effects. This medication can cause serious bleeding if it affects your blood clotting proteins too much (shown by unusually high INR lab results). Even if your doctor stops your medication, this risk of bleeding can continue for up to a week. Tell your doctor right away if you have any signs of serious bleeding, including: unusual pain/swelling/discomfort, unusual/easy bruising, prolonged bleeding from cuts or gums, persistent/frequent nosebleeds, unusually heavy/prolonged menstrual flow, pink/dark urine, coughing up blood, vomit that is bloody or looks like coffee grounds, severe headache, dizziness/fainting, unusual or persistent tiredness/weakness, bloody/black/tarry stools, chest pain, shortness of breath, difficulty swallowing. Tell your doctor right away if any of these unlikely but serious side effects occur: persistent nausea/vomiting, severe stomach/abdominal pain, yellowing eyes/skin. This drug rarely has caused very serious (possibly fatal) problems if its effects lead to small blood clots (usually at the beginning of treatment). This can lead to severe skin/tissue damage that may require surgery or amputation if left  untreated. Patients with certain blood conditions (protein C or S deficiency) may be at greater risk. Get medical help right away if any of these rare but serious side effects occur: painful/red/purplish patches on the skin (such as on the toe, breast, abdomen), change in the amount of urine, vision changes, confusion, slurred speech, weakness on one side of the body. A very serious allergic reaction to this drug is rare. However, get medical help right away if you notice any symptoms of a serious allergic reaction, including: rash, itching/swelling (especially of the face/tongue/throat), severe dizziness, trouble breathing. This is not a complete list of possible side effects. If you notice other effects not listed above, contact your doctor or pharmacist. In the [place] - Call your doctor for medical advice about side effects. You may report side effects to FDA at 0-868-LPQ-3270. In [place] - Call your doctor for medical advice about side effects. You may report side effects to [facility] [place] at 1-538.574.2218.      PRECAUTIONS:  Before taking warfarin, tell your doctor or pharmacist if you are allergic to it; or if you have any other allergies. This product may contain inactive ingredients, which can cause allergic reactions or other problems. Talk to your pharmacist for more details. Before using this medication, tell your doctor or pharmacist your medical history, especially of: blood disorders (such as anemia, hemophilia), bleeding problems (such as bleeding of the stomach/intestines, bleeding in the brain), blood vessel disorders (such as aneurysms), recent major injury/surgery, liver disease, alcohol use, mental/mood disorders (including memory problems), frequent falls/injuries. It is important that all your doctors and dentists know that you take warfarin. Before having surgery or any medical/dental procedures, tell your doctor or dentist that you are taking this medication and about all the products you use  (including prescription drugs, nonprescription drugs, and herbal products). Avoid getting injections into the muscles. If you must have an injection into a muscle (for example, a flu shot), it should be given in the arm. This way, it will be easier to check for bleeding and/or apply pressure bandages. This medication may cause stomach bleeding. Daily use of alcohol while using this medicine will increase your risk for stomach bleeding and may also affect how this medication works. Limit or avoid alcoholic beverages. If you have not been eating well, if you have an illness or infection that causes fever, vomiting, or diarrhea for more than 2 days, or if you start using any antibiotic medications, contact your doctor or pharmacist immediately because these conditions can affect how warfarin works. This medication can cause heavy bleeding. To lower the chance of getting cut, bruised, or injured, use great caution with sharp objects like safety razors and nail cutters. Use an electric razor when shaving and a soft toothbrush when brushing your teeth. Avoid activities such as contact sports. If you fall or injure yourself, especially if you hit your head, call your doctor immediately. Your doctor may need to check you. The Food & Drug Administration has stated that generic warfarin products are interchangeable. However, consult your doctor or pharmacist before switching warfarin products. Be careful not to take more than one medication that contains warfarin unless specifically directed by the doctor or health care provider who is monitoring your warfarin treatment. Older adults may be at greater risk for bleeding while using this drug. This medication is not recommended for use during pregnancy because of serious (possibly fatal) harm to an unborn baby. Discuss the use of reliable forms of birth control with your doctor. If you become pregnant or think you may be pregnant, tell your doctor immediately. If you are  "planning pregnancy, discuss a plan for managing your condition with your doctor before you become pregnant. Your doctor may switch the type of medication you use during pregnancy. Very small amounts of this medication may pass into breast milk but is unlikely to harm a nursing infant. Consult your doctor before breast-feeding.      DRUG INTERACTIONS:  Drug interactions may change how your medications work or increase your risk for serious side effects. This document does not contain all possible drug interactions. Keep a list of all the products you use (including prescription/nonprescription drugs and herbal products) and share it with your doctor and pharmacist. Do not start, stop, or change the dosage of any medicines without your doctor's approval. Warfarin interacts with many prescription, nonprescription, vitamin, and herbal products. This includes medications that are applied to the skin or inside the vagina or rectum. The interactions with warfarin usually result in an increase or decrease in the \"blood-thinning\" (anticoagulant) effect. Your doctor or other health care professional should closely monitor you to prevent serious bleeding or clotting problems. While taking warfarin, it is very important to tell your doctor or pharmacist of any changes in medications, vitamins, or herbal products that you are taking. Some products that may interact with this drug include: capecitabine, imatinib, mifepristone. Aspirin, aspirin-like drugs (salicylates), and nonsteroidal anti-inflammatory drugs (NSAIDs such as ibuprofen, naproxen, celecoxib) may have effects similar to warfarin. These drugs may increase the risk of bleeding problems if taken during treatment with warfarin. Carefully check all prescription/nonprescription product labels (including drugs applied to the skin such as pain-relieving creams) since the products may contain NSAIDs or salicylates. Talk to your doctor about using a different medication (such " as acetaminophen) to treat pain/fever. Low-dose aspirin and related drugs (such as clopidogrel, ticlopidine) should be continued if prescribed by your doctor for specific medical reasons such as heart attack or stroke prevention. Consult your doctor or pharmacist for more details. Many herbal products interact with warfarin. Tell your doctor before taking any herbal products, especially bromelains, coenzyme Q10, cranberry, danshen, dong quai, fenugreek, garlic, ginkgo biloba, ginseng, and Abelardo's wort, among others. This medication may interfere with a certain laboratory test to measure theophylline levels, possibly causing false test results. Make sure laboratory personnel and all your doctors know you use this drug.      OVERDOSE:  If overdose is suspected, contact a poison control center or emergency room immediately. [place] residents can call the [place] National Poison Hotline at 1-998.595.5965. [place] residents can call a provincial poison control center. Symptoms of overdose may include: bloody/black/tarry stools, pink/dark urine, unusual/prolonged bleeding.      NOTES:  Do not share this medication with others. Laboratory and/or medical tests (such as INR, complete blood count) must be performed periodically to monitor your progress or check for side effects. Consult your doctor for more details.      MISSED DOSE:  For the best possible benefit, do not miss any doses. If you do miss a dose and remember on the same day, take it as soon as you remember. If you remember on the next day, skip the missed dose and resume your usual dosing schedule. Do not double the dose to catch up because this could increase your risk for bleeding. Keep a record of missed doses to give to your doctor or pharmacist. Contact your doctor or pharmacist if you miss 2 or more doses in a row.      STORAGE:  Store at room temperature away from light and moisture. Do not store in the bathroom. Keep all medications away from children and pets.  Do not flush medications down the toilet or pour them into a drain unless instructed to do so. Properly discard this product when it is  or no longer needed. Consult your pharmacist or local waste disposal company for more details about how to safely discard your product.      MEDICAL ALERT:  Your condition and medication can cause complications in a medical emergency. For information about enrolling in MedicAlert, call 1-535.976.9708 ([place]) or 1-692.256.8245 ([place]).      Information last revised 2010 Copyright(c)  [employer]             Depression / Suicide Risk    As you are discharged from this [facility] facility, it is important to learn how to keep safe from harming yourself.    Recognize the warning signs:  · Abrupt changes in personality, positive or negative- including increase in energy   · Giving away possessions  · Change in eating patterns- significant weight changes-  positive or negative  · Change in sleeping patterns- unable to sleep or sleeping all the time   · Unwillingness or inability to communicate  · Depression  · Unusual sadness, discouragement and loneliness  · Talk of wanting to die  · Neglect of personal appearance   · Rebelliousness- reckless behavior  · Withdrawal from people/activities they love  · Confusion- inability to concentrate     If you or a loved one observes any of these behaviors or has concerns about self-harm, here's what you can do:  · Talk about it- your feelings and reasons for harming yourself  · Remove any means that you might use to hurt yourself (examples: pills, rope, extension cords, firearm)  · Get professional help from the community (Mental Health, Substance Abuse, psychological counseling)  · Do not be alone:Call your Safe Contact- someone whom you trust who will be there for you.  · Call your local CRISIS HOTLINE 618-7626 or 100-710-4414  · Call your local Children's Mobile Crisis Response Team [place] (911) 765-0594 or  www.Central Desktop.iSkoot  · Call the toll free National Suicide Prevention Hotlines   · National Suicide Prevention Lifeline 324-420-NFPS (4690)  · National Hope Line Network 800-SUICIDE (419-1713)

## 2018-11-07 NOTE — THERAPY
"Physical Therapy Evaluation completed.   Bed Mobility:  Supine to Sit: Contact Guard Assist  Transfers: Sit to Stand: Minimal Assist  Gait: Level Of Assist: Contact Guard Assist with Front-Wheel Walker       Plan of Care: Will benefit from Physical Therapy for DC needs  Discharge Recommendations: Equipment: Front-Wheel Walker. Post-acute therapy: Discharge to home with home health for additional physical therapy services.    Patient is an 85 year old male with PMHx including HTN, atrial fibrillation, on chronic anticoagulation, prostate CA, and with pacemaker that was transferred from outside facility with possible stroke or TIA. Patient presented to PT with impaired safety awareness and insight into deficits, impaired balance, and impaired coordination. Patient and spouse reported patient appears to be at baseline. Patient ambulated in room with SPC and CGA, demonstrated unsteady gait and furniture walking. Gait improved with FWW though patient resistant to use. Provided education regarding fall risk and increased mortality due to age, strongly recommended use of FWW. Spouse reported she is able to provide 24/7 supervision upon return home and assist as needed. Patient will benefit from home health PT following medical DC and return home. Will continue to follow during acute stay.    See \"Rehab Therapy-Acute\" Patient Summary Report for complete documentation.     "

## 2018-11-07 NOTE — PROGRESS NOTES
Inpatient Anticoagulation Service Note    Date: 11/7/2018  Reason for Anticoagulation: Atrial Fibrillation   [id number] VASc Score: 5    Hemoglobin Value: 15.9  Hematocrit Value: 47.3  Lab Platelet Value: (!) 146  Target INR: 2.0 to 3.0    INR from last 7 days     Date/Time INR Value    11/07/18 0259 (!)  1.85    11/06/18 1238 (!)  1.78        Dose from last 7 days     Date/Time Dose (mg)    11/07/18 0800  2.5    11/06/18 1700  4        Average Dose (mg):  Warfarin 2.5 mg Mon/Thur and 1.25 mg AOD   Significant Interactions: Aspirin, Corticosteroids, Statin  Bridge Therapy: No    Reversal Agent Administered: Not Applicable  Comments: Slight increase in INR, but still sub-therapeutic this morning. Will give 2.5 mg dose to help bring pt into range and continue to trend INRs. No new DDI noted. Slight dip in H/H, will continue to monitor for additional downtrend. No s/sx of bleeding noted in chart.    Plan:  Give 2.5 mg tonight and check INR tomorrow.   Education Material Provided?: No    Pharmacist suggested discharge dosing: It seems reasonable at this point to resume pt's home dose of 2.5 mg Mon/Thurs and 1.25 mg AOD with close f/u with anticoagulation clinic within 3 days.     Yulia Zhu, Pharmacy Intern

## 2018-11-07 NOTE — THERAPY
"Occupational Therapy Evaluation completed.   Functional Status: SBA supine > < EOB, CGA stand-pivot transfers with FWW, SBA LB dressing  Plan of Care: Patient with no further skilled OT needs in the acute care setting at this time  Discharge Recommendations:  Equipment: No Equipment Needed from OT standpoint. Please see PT recs for possible gait device. Post-acute therapy: Recommend home health for continued occupational therapy services    See \"Rehab Therapy-Acute\" Patient Summary Report for complete documentation.    85 y.o. male with h/o HTN, a-fib, prostate cancer, PPM, on anticoagulation therapy, who presented with aphasia. Unable to complete MRI due to PPM. Dx with TIA. Pt seen for OT eval. Pt presents with unsteady gait during mobility, had difficulty using FWW. Pt uses SPC at baseline. Reports h/o falls. Pt was able to perform transfers, LB dressing, bed mobility. Declined grooming due to already completed. BUE present with symmetrical strength, sensation, coordination. UE motor is bradykinetic but functional. No visual deficits detected. Cognitively pt is slow to respond, oriented to all but day of month. Pt resides with wife who assists with most I-ADL, however he reports he still drives and runs errands on his own. Pt appears to be at functional baseline from OT standpoint, however would recommend 24/7 supv due to fall risk, decreased awareness of deficits. Pt may benefit from  OT on DC for home safety eval.     "

## 2018-11-07 NOTE — ASSESSMENT & PLAN NOTE
Stable and assymptomatic.  INR slightly low. He is agreeable to maintaining it between 2-3.  Since he is now on a statin this will likely help increase his INR slightly as well. He is advised of this.

## 2018-11-07 NOTE — ASSESSMENT & PLAN NOTE
Transient aphasia in the setting of subtherapeutic INR  TIA / Stroke workup  Discussed coumadin with patient and wife. He got the wrong dose on Monday.

## 2018-11-07 NOTE — PROGRESS NOTES
Dr. Melendez pagecandace for order for home health and walker per PT/OT recommendations for discharge. Awaiting call back.

## 2018-11-07 NOTE — ASSESSMENT & PLAN NOTE
SBP goal less than 140 mmHg  DBP goal less than 90 mmHg  PRN and antihypertensives to titrate by hospitalist towards goal  Most recent Blood Pressure : 140/89

## 2018-11-07 NOTE — DISCHARGE PLANNING
Anticipated Discharge Disposition: Home w/ HH    Action: LSW spoke with bedside RN, Angelita. Pt is in need of HH.    LSW contacted Dr. Melendez via [place] Text for an order to be placed for HH. F2[facility] has already been placed.    LSW met with pt and pt's wife, Shyanne sequeira. This worker explained HH level of care and obtained CHOICE for 1) [facility] and 2) [facility].  Home address, phone # and PCP confirmed.    LSW faxed HH CHOICE to [facility]    Barriers to Discharge: HH Acceptance    Plan: LSW to f/u with [facility]

## 2018-11-07 NOTE — FACE TO FACE
Face to Face Supporting Documentation - Home Health    The encounter with this patient was in whole or in part the primary reason for home health admission.    Date of encounter:   Patient:                    MRN:                       YOB: 2018  Jim Stapleton  [id number]  12/9/1932     Home health to see patient for:  Skilled Nursing care for assessment, interventions & education, Medical social work consult, Home health aide, Physical Therapy evaluation and treatment and Occupational therapy evaluation and treatment    Skilled need for:  Recent Deterioration of Health Status due to chronic medical conditions and new TIA with fall and weakness    Skilled nursing interventions to include:  Comment: medication management and disease teaching    Homebound status evidenced by:  Need the aid of supportive devices such as crutches, canes, wheelchairs or walkers or Needs the assistance of another person in order to leave the home. Leaving home requires a considerable and taxing effort. There is a normal inability to leave the home.    Community Physician to provide follow up care: Adolph Johnston M.D.     Optional Interventions? No      I certify the face to face encounter for this home health care referral meets the CMS requirements and the encounter/clinical assessment with the patient was, in whole, or in part, for the medical condition(s) listed above, which is the primary reason for home health care. Based on my clinical findings: the service(s) are medically necessary, support the need for home health care, and the homebound criteria are met.  I certify that this patient has had a face to face encounter by myself.  Wilber Melendez M.D. - NPI: [id number]

## 2018-11-08 VITALS
RESPIRATION RATE: 19 BRPM | TEMPERATURE: 97 F | OXYGEN SATURATION: 98 % | HEIGHT: 66 IN | SYSTOLIC BLOOD PRESSURE: 140 MMHG | BODY MASS INDEX: 26.36 KG/M2 | DIASTOLIC BLOOD PRESSURE: 89 MMHG | WEIGHT: 164 LBS | HEART RATE: 68 BPM

## 2018-11-08 LAB
INR PPP: 2.53 (ref 0.87–1.13)
PROTHROMBIN TIME: 27.3 SEC (ref 12–14.6)

## 2018-11-08 PROCEDURE — A9270 NON-COVERED ITEM OR SERVICE: HCPCS | Performed by: HOSPITALIST

## 2018-11-08 PROCEDURE — 99217 PR OBSERVATION CARE DISCHARGE: CPT | Performed by: HOSPITALIST

## 2018-11-08 PROCEDURE — G0378 HOSPITAL OBSERVATION PER HR: HCPCS

## 2018-11-08 PROCEDURE — 36415 COLL VENOUS BLD VENIPUNCTURE: CPT

## 2018-11-08 PROCEDURE — A9270 NON-COVERED ITEM OR SERVICE: HCPCS | Performed by: INTERNAL MEDICINE

## 2018-11-08 PROCEDURE — 700111 HCHG RX REV CODE 636 W/ 250 OVERRIDE (IP): Performed by: INTERNAL MEDICINE

## 2018-11-08 PROCEDURE — 700102 HCHG RX REV CODE 250 W/ 637 OVERRIDE(OP): Performed by: INTERNAL MEDICINE

## 2018-11-08 PROCEDURE — 700102 HCHG RX REV CODE 250 W/ 637 OVERRIDE(OP): Performed by: HOSPITALIST

## 2018-11-08 PROCEDURE — 85610 PROTHROMBIN TIME: CPT

## 2018-11-08 RX ADMIN — ASPIRIN 81 MG: 81 TABLET, CHEWABLE ORAL at 05:29

## 2018-11-08 RX ADMIN — GABAPENTIN 300 MG: 300 CAPSULE ORAL at 05:29

## 2018-11-08 RX ADMIN — LISINOPRIL 20 MG: 20 TABLET ORAL at 05:29

## 2018-11-08 RX ADMIN — CARVEDILOL 3.12 MG: 6.25 TABLET, FILM COATED ORAL at 08:27

## 2018-11-08 RX ADMIN — PREDNISONE 6 MG: 1 TABLET ORAL at 05:29

## 2018-11-08 ASSESSMENT — PAIN SCALES - GENERAL: PAINLEVEL_OUTOF10: 0

## 2018-11-08 NOTE — PROGRESS NOTES
Pt discharged home with spouse. All discharge instructions, new medications, follow up appointments, home health and all questions and concerns addressed. All belongings gathered from room by RN and verified by spouse. Pt discharged via wheelchair with spouse and volunteer.

## 2018-11-08 NOTE — PROGRESS NOTES
Monitor summary: Afib/Aflutter 73 - 105, [place] --, QRS .12, QT -- with frequent PVCs, rare coup,  per strip from monitor summary

## 2018-11-08 NOTE — PROGRESS NOTES
2nd page to Dr. Melendez for home health order. Also spoke with Briana,  regarding needing home health order. Briana to contact MD also.

## 2018-11-08 NOTE — DISCHARGE PLANNING
Agency/Facility Name: [facility]  Spoke To: Jenelle  Outcome: Referral not received. Referral resent. Jenelle notified of estimated discharge date.

## 2018-11-08 NOTE — DISCHARGE PLANNING
Follow up for rehab work up negative for new stroke, therapy recommending home with home health. No physiatry consult ordered per protocol.

## 2018-11-08 NOTE — PROGRESS NOTES
Spoke with Dr. Melendez. MD stated OK to d/c pt without home health set up. Briana,  notified as well.

## 2018-11-08 NOTE — DISCHARGE PLANNING
Agency/Facility Name: [facility]  Spoke To: Jenelle  Outcome: Patient accepted. Briana(KATEW) notified.

## 2018-11-08 NOTE — PROGRESS NOTES
Hospital Medicine Daily Progress Note    Date of Service  11/7/2018    Chief Complaint  85 y.o. male admitted 11/6/2018 with TIA symptoms    Hospital Course    This is an 85 year old gentleman with TIA symptoms. He was observed and testing for source was ordered.      Interval Problem Update  11/7 - late entry note - patient was seen and imaging and workup negative. He felt well but was unsteady on his feet. He was recommended for home health and this was ordered. It was pending and he was held overnight until it could be arranged.    Consultants/Specialty  None    Code Status  Full    Disposition  Home with home health    Review of Systems  Review of Systems   Constitutional: Negative for chills and fever.   Respiratory: Negative for cough and shortness of breath.    Cardiovascular: Negative for chest pain and palpitations.   Gastrointestinal: Negative for abdominal pain, constipation, diarrhea, nausea and vomiting.   Genitourinary: Negative for dysuria.   Musculoskeletal: Negative for myalgias.   Skin: Negative for itching.   Neurological: Positive for weakness. Negative for dizziness, focal weakness and headaches.   All other systems reviewed and are negative.       Physical Exam  Temp:  [36 °C (96.8 °F)-36.9 °C (98.5 °F)] 36.1 °C (97 °F)  Pulse:  [68-89] 68  Resp:  [16-20] 19  BP: (105-157)/(68-90) 140/89    Physical Exam   Constitutional: He is oriented to person, place, and time. He appears well-developed and well-nourished. No distress.   Elderly, frail, generally mildly weak   HENT:   Head: Normocephalic and atraumatic.   Eyes: Pupils are equal, round, and reactive to light. EOM are normal.   Neck: Normal range of motion. Neck supple. No tracheal deviation present.   Cardiovascular: Normal rate, regular rhythm, normal heart sounds and intact distal pulses.    No murmur heard.  Pulmonary/Chest: Effort normal and breath sounds normal. No respiratory distress. He has no rales.   Abdominal: Soft. Bowel sounds  are normal. He exhibits no distension.   Musculoskeletal: Normal range of motion. He exhibits no edema.   Neurological: He is alert and oriented to person, place, and time.   Skin: Skin is warm and dry.   Vitals reviewed.      Fluids    Intake/Output Summary (Last 24 hours) at 11/08/18 0846  Last data filed at 11/07/18 1800   Gross per 24 hour   Intake              720 ml   Output                1 ml   Net              719 ml       Laboratory  Recent Labs      11/06/18   1024  11/06/18   1238  11/07/18   0259   WBC  10.4  12.1*  8.1   RBC  5.03  5.22  4.69*   HEMOGLOBIN  17.1  17.8  15.9   HEMATOCRIT  51.9  53.2*  47.3   MCV  103.2*  101.9*  100.9*   MCH  34.0*  34.1*  33.9*   MCHC  32.9*  33.5*  33.6*   RDW  14.1  52.5*  51.0*   PLATELETCT  169  164  146*   MPV  10.9*  10.7  11.1     Recent Labs      11/06/18   1024  11/06/18   1238  11/07/18   0259   SODIUM  146*  143  141   POTASSIUM  3.7  3.9  3.8   CHLORIDE  110*  108  109   CO2  27  25  25   GLUCOSE  69*  82  90   BUN  23*  22  22   CREATININE  1.4*  1.21  1.11   CALCIUM  8.9  9.6  9.0     Recent Labs      11/06/18   1024  11/06/18   1238  11/07/18   0259  11/08/18   0325   APTT  25.4  28.1   --    --    INR  1.77  1.78*  1.85*  2.53*         Recent Labs      11/07/18   0259   TRIGLYCERIDE  78   HDL  41   LDL  107*       Imaging  EC-ECHOCARDIOGRAM COMPLETE W/O CONT   Final Result      DX-CHEST-PORTABLE (1 VIEW)   Final Result      8 mm left basilar nodule is not excluded. Small nodules are seen in the right upper lobe. Correlation with prior imaging would be of value. If no prior imaging is available for comparison, further evaluation with CT chest is recommended.      Cardiomegaly.      Atherosclerotic plaque.      Mild bibasilar opacities likely represent atelectasis.         CT-CTA NECK WITH & W/O-POST PROCESSING   Final Result      1.  Unremarkable CT angiogram of the neck. No large vessel occlusion, high-grade stenosis, dissection or aneurysm.   2.   Biapical emphysema.      CT-CTA HEAD WITH & W/O-POST PROCESS   Final Result         1. 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   2. No evidence of large vessel occlusion or high-grade stenosis of the [place].      OUTSIDE IMAGES-CT HEAD   Final Result      CT-CEREBRAL PERFUSION ANALYSIS   Final Result      1.  CT perfusion examination over the limited section of brain reveals 0 mL of brain parenchyma has less than 30% of cerebral blood flow (CBF).      2.  Please note that the cerebral perfusion was performed on the limited brain tissue around the basal ganglia region. Infarct/ischemia outside the CT perfusion sections can be missed in this study.      CT-HEAD W/O   Final Result      No acute intracranial abnormality is identified.      Atrophy      There are periventricular and subcortical white matter changes present.  This finding is nonspecific and could be from previous small vessel ischemia, demyelination, or gliosis.           Assessment/Plan  TIA (transient ischemic attack)- (present on admission)   Assessment & Plan    Transient aphasia in the setting of subtherapeutic INR  TIA / Stroke workup  Discussed coumadin with patient and wife. He got the wrong dose on Monday.     Hypertension- (present on admission)   Assessment & Plan    SBP goal less than 140 mmHg  DBP goal less than 90 mmHg  PRN and antihypertensives to titrate by hospitalist towards goal  Most recent Blood Pressure : 140/89      PMR (polymyalgia rheumatica) ([facility])- (present on admission)   Assessment & Plan    Prednisone suppressively.   He follows with rheum.     AF (atrial fibrillation) ([facility])- (present on admission)   Assessment & Plan    Stable and assymptomatic.  INR slightly low. He is agreeable to maintaining it between 2-3.  Since he is now on a statin this will likely help increase his INR slightly as well. He is advised of this.     Chronic anticoagulation- (present on admission)   Assessment & Plan    Continue  warfarin per pharmacy.  Goal of 2-3          VTE prophylaxis: coumadin

## 2018-11-08 NOTE — PROGRESS NOTES
Monitor Summary: afib , [place] .--, QRS .08, QT .-- with rare PVC, runs of BBB, triplets, couplets, per strip from monitor room

## 2018-11-08 NOTE — PROGRESS NOTES
Bedside shift report received. Assumed care of patient at this time. Patient resting in bed comfortably with eyes closed. Call light and personal items within reach. No further requests per patient at this time.

## 2018-11-08 NOTE — PROGRESS NOTES
Pt A&Ox4, no complaints of pain. NIH 1 due to limb ataxia. Pt ambulating with FWW and 1 assist to bathroom. Tolerating diet. GEORGIE GOMEZ on tele. Awaiting home health auth for discharge.

## 2018-11-09 ASSESSMENT — ENCOUNTER SYMPTOMS
COUGH: 0
PALPITATIONS: 0
NAUSEA: 0
SHORTNESS OF BREATH: 0
FOCAL WEAKNESS: 0
VOMITING: 0
CONSTIPATION: 0
WEAKNESS: 1
CHILLS: 0
DIZZINESS: 0
FEVER: 0
DIARRHEA: 0
HEADACHES: 0
MYALGIAS: 0
ABDOMINAL PAIN: 0

## 2019-04-12 PROBLEM — Z95.0 PACEMAKER: Status: ACTIVE | Noted: 2019-04-12

## 2019-04-12 PROBLEM — E11.9 TYPE 2 DIABETES MELLITUS WITHOUT COMPLICATION, WITHOUT LONG-TERM CURRENT USE OF INSULIN (HCC): Status: ACTIVE | Noted: 2019-04-12

## 2019-04-15 PROBLEM — E11.9 TYPE 2 DIABETES MELLITUS WITHOUT COMPLICATION, WITHOUT LONG-TERM CURRENT USE OF INSULIN (HCC): Status: RESOLVED | Noted: 2019-04-12 | Resolved: 2019-04-15

## 2019-07-29 ENCOUNTER — HOSPITAL ENCOUNTER (OUTPATIENT)
Dept: HOSPITAL 8 - STAR | Age: 84
Discharge: HOME | End: 2019-07-29
Attending: NEUROLOGICAL SURGERY
Payer: MEDICARE

## 2019-07-29 DIAGNOSIS — M51.36: ICD-10-CM

## 2019-07-29 DIAGNOSIS — M48.01: ICD-10-CM

## 2019-07-29 DIAGNOSIS — Z01.818: Primary | ICD-10-CM

## 2019-07-29 DIAGNOSIS — R94.31: ICD-10-CM

## 2019-07-29 LAB
ALBUMIN SERPL-MCNC: 3.5 G/DL (ref 3.4–5)
ALP SERPL-CCNC: 113 U/L (ref 45–117)
ALT SERPL-CCNC: 30 U/L (ref 12–78)
ANION GAP SERPL CALC-SCNC: 7 MMOL/L (ref 5–15)
APTT BLD: 40 SECONDS (ref 25–31)
BASOPHILS # BLD AUTO: 0.04 X10^3/UL (ref 0–0.1)
BASOPHILS NFR BLD AUTO: 1 % (ref 0–1)
BILIRUB SERPL-MCNC: 1.8 MG/DL (ref 0.2–1)
CALCIUM SERPL-MCNC: 9.6 MG/DL (ref 8.5–10.1)
CHLORIDE SERPL-SCNC: 113 MMOL/L (ref 98–107)
CREAT SERPL-MCNC: 0.99 MG/DL (ref 0.7–1.3)
CULTURE INDICATED?: NO
EOSINOPHIL # BLD AUTO: 0.23 X10^3/UL (ref 0–0.4)
EOSINOPHIL NFR BLD AUTO: 3 % (ref 1–7)
ERYTHROCYTE [DISTWIDTH] IN BLOOD BY AUTOMATED COUNT: 14.7 % (ref 9.4–14.8)
INR PPP: 4.66 (ref 0.93–1.1)
LYMPHOCYTES # BLD AUTO: 1.36 X10^3/UL (ref 1–3.4)
LYMPHOCYTES NFR BLD AUTO: 17 % (ref 22–44)
MCH RBC QN AUTO: 33.9 PG (ref 27.5–34.5)
MCHC RBC AUTO-ENTMCNC: 33 G/DL (ref 33.2–36.2)
MCV RBC AUTO: 102.6 FL (ref 81–97)
MD: NO
MICROSCOPIC: (no result)
MONOCYTES # BLD AUTO: 1 X10^3/UL (ref 0.2–0.8)
MONOCYTES NFR BLD AUTO: 12 % (ref 2–9)
NEUTROPHILS # BLD AUTO: 5.38 X10^3/UL (ref 1.8–6.8)
NEUTROPHILS NFR BLD AUTO: 67 % (ref 42–75)
PLATELET # BLD AUTO: 214 X10^3/UL (ref 130–400)
PMV BLD AUTO: 9.2 FL (ref 7.4–10.4)
PROT SERPL-MCNC: 7.4 G/DL (ref 6.4–8.2)
PROTHROMBIN TIME: 46.1 SECONDS (ref 9.6–11.5)
RBC # BLD AUTO: 4.74 X10^6/UL (ref 4.38–5.82)

## 2019-07-29 PROCEDURE — 85610 PROTHROMBIN TIME: CPT

## 2019-07-29 PROCEDURE — 81003 URINALYSIS AUTO W/O SCOPE: CPT

## 2019-07-29 PROCEDURE — 36415 COLL VENOUS BLD VENIPUNCTURE: CPT

## 2019-07-29 PROCEDURE — 71046 X-RAY EXAM CHEST 2 VIEWS: CPT

## 2019-07-29 PROCEDURE — 85730 THROMBOPLASTIN TIME PARTIAL: CPT

## 2019-07-29 PROCEDURE — 93005 ELECTROCARDIOGRAM TRACING: CPT

## 2019-07-29 PROCEDURE — 80053 COMPREHEN METABOLIC PANEL: CPT

## 2019-07-29 PROCEDURE — 85025 COMPLETE CBC W/AUTO DIFF WBC: CPT

## 2019-08-06 ENCOUNTER — HOSPITAL ENCOUNTER (INPATIENT)
Dept: HOSPITAL 8 - ORIP | Age: 84
LOS: 6 days | Discharge: NURSING, REHAB OR LONG-TERM CARE | DRG: 455 | End: 2019-08-12
Attending: NEUROLOGICAL SURGERY | Admitting: NEUROLOGICAL SURGERY
Payer: MEDICARE

## 2019-08-06 VITALS — HEIGHT: 68 IN | WEIGHT: 150.13 LBS | BODY MASS INDEX: 22.75 KG/M2

## 2019-08-06 DIAGNOSIS — M21.371: ICD-10-CM

## 2019-08-06 DIAGNOSIS — I25.2: ICD-10-CM

## 2019-08-06 DIAGNOSIS — M48.07: ICD-10-CM

## 2019-08-06 DIAGNOSIS — M47.26: Primary | ICD-10-CM

## 2019-08-06 DIAGNOSIS — M85.80: ICD-10-CM

## 2019-08-06 DIAGNOSIS — I48.91: ICD-10-CM

## 2019-08-06 DIAGNOSIS — Z79.899: ICD-10-CM

## 2019-08-06 DIAGNOSIS — Z85.46: ICD-10-CM

## 2019-08-06 DIAGNOSIS — I12.9: ICD-10-CM

## 2019-08-06 DIAGNOSIS — Z86.73: ICD-10-CM

## 2019-08-06 DIAGNOSIS — N18.9: ICD-10-CM

## 2019-08-06 PROCEDURE — 72100 X-RAY EXAM L-S SPINE 2/3 VWS: CPT

## 2019-08-06 PROCEDURE — 86900 BLOOD TYPING SEROLOGIC ABO: CPT

## 2019-08-06 PROCEDURE — 01NR0ZZ RELEASE SACRAL NERVE, OPEN APPROACH: ICD-10-PCS | Performed by: NEUROLOGICAL SURGERY

## 2019-08-06 PROCEDURE — 86850 RBC ANTIBODY SCREEN: CPT

## 2019-08-06 PROCEDURE — 70450 CT HEAD/BRAIN W/O DYE: CPT

## 2019-08-06 PROCEDURE — 80048 BASIC METABOLIC PNL TOTAL CA: CPT

## 2019-08-06 PROCEDURE — C1713 ANCHOR/SCREW BN/BN,TIS/BN: HCPCS

## 2019-08-06 PROCEDURE — 36415 COLL VENOUS BLD VENIPUNCTURE: CPT

## 2019-08-06 PROCEDURE — 0SG30A0 FUSION OF LUMBOSACRAL JOINT WITH INTERBODY FUSION DEVICE, ANTERIOR APPROACH, ANTERIOR COLUMN, OPEN APPROACH: ICD-10-PCS | Performed by: NEUROLOGICAL SURGERY

## 2019-08-06 PROCEDURE — 01NB0ZZ RELEASE LUMBAR NERVE, OPEN APPROACH: ICD-10-PCS | Performed by: NEUROLOGICAL SURGERY

## 2019-08-06 PROCEDURE — 0SG30K1 FUSION OF LUMBOSACRAL JOINT WITH NONAUTOLOGOUS TISSUE SUBSTITUTE, POSTERIOR APPROACH, POSTERIOR COLUMN, OPEN APPROACH: ICD-10-PCS | Performed by: NEUROLOGICAL SURGERY

## 2019-08-06 PROCEDURE — 74018 RADEX ABDOMEN 1 VIEW: CPT

## 2019-08-06 PROCEDURE — C1763 CONN TISS, NON-HUMAN: HCPCS

## 2019-08-06 PROCEDURE — 71045 X-RAY EXAM CHEST 1 VIEW: CPT

## 2019-08-06 PROCEDURE — 81001 URINALYSIS AUTO W/SCOPE: CPT

## 2019-08-06 PROCEDURE — 85025 COMPLETE CBC W/AUTO DIFF WBC: CPT

## 2019-08-06 PROCEDURE — 4A11X4G MONITORING OF PERIPHERAL NERVOUS ELECTRICAL ACTIVITY, INTRAOPERATIVE, EXTERNAL APPROACH: ICD-10-PCS | Performed by: NEUROLOGICAL SURGERY

## 2019-08-06 PROCEDURE — 85730 THROMBOPLASTIN TIME PARTIAL: CPT

## 2019-08-06 PROCEDURE — 85610 PROTHROMBIN TIME: CPT

## 2019-08-12 VITALS — DIASTOLIC BLOOD PRESSURE: 80 MMHG | SYSTOLIC BLOOD PRESSURE: 141 MMHG

## 2019-12-10 ENCOUNTER — HOSPITAL ENCOUNTER (OUTPATIENT)
Dept: HOSPITAL 8 - INFUSION | Age: 84
Discharge: HOME | End: 2019-12-10
Attending: NURSE PRACTITIONER
Payer: MEDICARE

## 2019-12-10 VITALS — SYSTOLIC BLOOD PRESSURE: 159 MMHG | DIASTOLIC BLOOD PRESSURE: 100 MMHG

## 2019-12-10 VITALS — HEIGHT: 67 IN | WEIGHT: 149.25 LBS | BODY MASS INDEX: 23.43 KG/M2

## 2019-12-10 DIAGNOSIS — M81.0: Primary | ICD-10-CM

## 2019-12-10 DIAGNOSIS — Z85.46: ICD-10-CM

## 2019-12-10 DIAGNOSIS — Z79.899: ICD-10-CM

## 2019-12-10 DIAGNOSIS — I12.9: ICD-10-CM

## 2019-12-10 DIAGNOSIS — Z86.73: ICD-10-CM

## 2019-12-10 DIAGNOSIS — M48.07: ICD-10-CM

## 2019-12-10 DIAGNOSIS — I25.2: ICD-10-CM

## 2019-12-10 DIAGNOSIS — N18.9: ICD-10-CM

## 2019-12-10 DIAGNOSIS — I48.91: ICD-10-CM

## 2019-12-10 LAB
CALCIUM SERPL-MCNC: 9.3 MG/DL (ref 8.5–10.1)
CREAT SERPL-MCNC: 1.07 MG/DL (ref 0.7–1.3)

## 2019-12-10 PROCEDURE — 82565 ASSAY OF CREATININE: CPT

## 2019-12-10 PROCEDURE — 82310 ASSAY OF CALCIUM: CPT

## 2019-12-10 PROCEDURE — 36415 COLL VENOUS BLD VENIPUNCTURE: CPT

## 2019-12-10 PROCEDURE — 96365 THER/PROPH/DIAG IV INF INIT: CPT

## 2019-12-10 PROCEDURE — 36591 DRAW BLOOD OFF VENOUS DEVICE: CPT

## 2020-12-23 ENCOUNTER — PATIENT OUTREACH (OUTPATIENT)
Dept: HEALTH INFORMATION MANAGEMENT | Facility: OTHER | Age: 85
End: 2020-12-23

## 2020-12-24 NOTE — PROGRESS NOTES
Called patient to schedule for AWV.    Outcome:  Spoke to patient. He asked I schedule with his wife Shyanne. Scheduled appointment for 1/4/2021.  Confirmed no active COVID-19 symptoms, no current exposure. advised to wear mask.    Attempt # 1  -aep

## 2021-12-15 ENCOUNTER — TELEPHONE (OUTPATIENT)
Dept: HEALTH INFORMATION MANAGEMENT | Facility: OTHER | Age: 86
End: 2021-12-15